# Patient Record
Sex: FEMALE | Race: WHITE | NOT HISPANIC OR LATINO
[De-identification: names, ages, dates, MRNs, and addresses within clinical notes are randomized per-mention and may not be internally consistent; named-entity substitution may affect disease eponyms.]

---

## 2017-01-04 ENCOUNTER — APPOINTMENT (OUTPATIENT)
Dept: ORTHOPEDIC SURGERY | Facility: CLINIC | Age: 57
End: 2017-01-04

## 2017-01-04 VITALS
WEIGHT: 204 LBS | DIASTOLIC BLOOD PRESSURE: 83 MMHG | HEIGHT: 67 IN | BODY MASS INDEX: 32.02 KG/M2 | HEART RATE: 73 BPM | TEMPERATURE: 97.8 F | SYSTOLIC BLOOD PRESSURE: 157 MMHG

## 2017-01-04 DIAGNOSIS — Z80.9 FAMILY HISTORY OF MALIGNANT NEOPLASM, UNSPECIFIED: ICD-10-CM

## 2017-01-04 DIAGNOSIS — Z78.9 OTHER SPECIFIED HEALTH STATUS: ICD-10-CM

## 2017-01-04 DIAGNOSIS — Z80.42 FAMILY HISTORY OF MALIGNANT NEOPLASM OF PROSTATE: ICD-10-CM

## 2017-01-04 DIAGNOSIS — Z82.62 FAMILY HISTORY OF OSTEOPOROSIS: ICD-10-CM

## 2017-01-04 DIAGNOSIS — Z82.61 FAMILY HISTORY OF ARTHRITIS: ICD-10-CM

## 2017-01-05 ENCOUNTER — OTHER (OUTPATIENT)
Age: 57
End: 2017-01-05

## 2017-01-24 ENCOUNTER — APPOINTMENT (OUTPATIENT)
Dept: ORTHOPEDIC SURGERY | Facility: CLINIC | Age: 57
End: 2017-01-24

## 2017-09-13 ENCOUNTER — NON-APPOINTMENT (OUTPATIENT)
Age: 57
End: 2017-09-13

## 2017-09-13 ENCOUNTER — APPOINTMENT (OUTPATIENT)
Dept: CARDIOLOGY | Facility: CLINIC | Age: 57
End: 2017-09-13
Payer: COMMERCIAL

## 2017-09-13 VITALS
OXYGEN SATURATION: 96 % | BODY MASS INDEX: 34.21 KG/M2 | DIASTOLIC BLOOD PRESSURE: 80 MMHG | SYSTOLIC BLOOD PRESSURE: 140 MMHG | WEIGHT: 218 LBS | RESPIRATION RATE: 16 BRPM | HEIGHT: 67 IN | HEART RATE: 69 BPM | TEMPERATURE: 98.2 F

## 2017-09-13 DIAGNOSIS — R23.8 OTHER SKIN CHANGES: ICD-10-CM

## 2017-09-13 DIAGNOSIS — M25.50 PAIN IN UNSPECIFIED JOINT: ICD-10-CM

## 2017-09-13 DIAGNOSIS — F41.9 ANXIETY DISORDER, UNSPECIFIED: ICD-10-CM

## 2017-09-13 PROCEDURE — 99204 OFFICE O/P NEW MOD 45 MIN: CPT | Mod: 25

## 2017-09-13 PROCEDURE — 93000 ELECTROCARDIOGRAM COMPLETE: CPT

## 2017-09-29 ENCOUNTER — APPOINTMENT (OUTPATIENT)
Dept: CARDIOLOGY | Facility: CLINIC | Age: 57
End: 2017-09-29
Payer: COMMERCIAL

## 2017-09-29 PROCEDURE — 93306 TTE W/DOPPLER COMPLETE: CPT

## 2017-10-11 ENCOUNTER — NON-APPOINTMENT (OUTPATIENT)
Age: 57
End: 2017-10-11

## 2017-10-11 ENCOUNTER — APPOINTMENT (OUTPATIENT)
Dept: CARDIOLOGY | Facility: CLINIC | Age: 57
End: 2017-10-11
Payer: COMMERCIAL

## 2017-10-11 VITALS
SYSTOLIC BLOOD PRESSURE: 142 MMHG | WEIGHT: 215 LBS | BODY MASS INDEX: 33.74 KG/M2 | TEMPERATURE: 98.6 F | HEIGHT: 67 IN | HEART RATE: 79 BPM | DIASTOLIC BLOOD PRESSURE: 80 MMHG | OXYGEN SATURATION: 95 %

## 2017-10-11 DIAGNOSIS — R07.9 CHEST PAIN, UNSPECIFIED: ICD-10-CM

## 2017-10-11 PROCEDURE — 93000 ELECTROCARDIOGRAM COMPLETE: CPT | Mod: 59

## 2017-10-11 PROCEDURE — 93320 DOPPLER ECHO COMPLETE: CPT

## 2017-10-11 PROCEDURE — 93351 STRESS TTE COMPLETE: CPT

## 2017-10-11 PROCEDURE — 99214 OFFICE O/P EST MOD 30 MIN: CPT | Mod: 25

## 2017-10-11 PROCEDURE — 93325 DOPPLER ECHO COLOR FLOW MAPG: CPT

## 2018-06-25 ENCOUNTER — APPOINTMENT (OUTPATIENT)
Dept: OBGYN | Facility: CLINIC | Age: 58
End: 2018-06-25
Payer: COMMERCIAL

## 2018-06-25 VITALS
BODY MASS INDEX: 32.96 KG/M2 | RESPIRATION RATE: 16 BRPM | HEART RATE: 80 BPM | SYSTOLIC BLOOD PRESSURE: 128 MMHG | WEIGHT: 210 LBS | DIASTOLIC BLOOD PRESSURE: 72 MMHG | HEIGHT: 67 IN

## 2018-06-25 DIAGNOSIS — Z82.49 FAMILY HISTORY OF ISCHEMIC HEART DISEASE AND OTHER DISEASES OF THE CIRCULATORY SYSTEM: ICD-10-CM

## 2018-06-25 DIAGNOSIS — Z81.8 FAMILY HISTORY OF OTHER MENTAL AND BEHAVIORAL DISORDERS: ICD-10-CM

## 2018-06-25 PROCEDURE — 99386 PREV VISIT NEW AGE 40-64: CPT

## 2018-06-26 LAB — HPV HIGH+LOW RISK DNA PNL CVX: NOT DETECTED

## 2018-06-27 LAB — CYTOLOGY CVX/VAG DOC THIN PREP: NORMAL

## 2018-07-16 ENCOUNTER — EMERGENCY (EMERGENCY)
Facility: HOSPITAL | Age: 58
LOS: 0 days | Discharge: HOME | End: 2018-07-16
Attending: EMERGENCY MEDICINE | Admitting: EMERGENCY MEDICINE
Payer: COMMERCIAL

## 2018-07-16 VITALS
RESPIRATION RATE: 16 BRPM | HEART RATE: 74 BPM | DIASTOLIC BLOOD PRESSURE: 67 MMHG | SYSTOLIC BLOOD PRESSURE: 131 MMHG | OXYGEN SATURATION: 97 %

## 2018-07-16 VITALS
SYSTOLIC BLOOD PRESSURE: 151 MMHG | HEIGHT: 62 IN | HEART RATE: 153 BPM | TEMPERATURE: 98 F | WEIGHT: 199.96 LBS | DIASTOLIC BLOOD PRESSURE: 118 MMHG | OXYGEN SATURATION: 100 % | RESPIRATION RATE: 20 BRPM

## 2018-07-16 DIAGNOSIS — R00.2 PALPITATIONS: ICD-10-CM

## 2018-07-16 DIAGNOSIS — R00.0 TACHYCARDIA, UNSPECIFIED: ICD-10-CM

## 2018-07-16 DIAGNOSIS — R94.31 ABNORMAL ELECTROCARDIOGRAM [ECG] [EKG]: ICD-10-CM

## 2018-07-16 LAB
ALBUMIN SERPL ELPH-MCNC: 4 G/DL — SIGNIFICANT CHANGE UP (ref 3.3–5)
ALP SERPL-CCNC: 82 U/L — SIGNIFICANT CHANGE UP (ref 40–120)
ALT FLD-CCNC: 22 U/L — SIGNIFICANT CHANGE UP (ref 12–78)
ANION GAP SERPL CALC-SCNC: 12 MMOL/L — SIGNIFICANT CHANGE UP (ref 5–17)
APPEARANCE UR: CLEAR — SIGNIFICANT CHANGE UP
APTT BLD: 34.9 SEC — SIGNIFICANT CHANGE UP (ref 27.5–37.4)
AST SERPL-CCNC: 24 U/L — SIGNIFICANT CHANGE UP (ref 15–37)
BASOPHILS # BLD AUTO: 0.09 K/UL — SIGNIFICANT CHANGE UP (ref 0–0.2)
BASOPHILS NFR BLD AUTO: 0.9 % — SIGNIFICANT CHANGE UP (ref 0–2)
BILIRUB SERPL-MCNC: 0.5 MG/DL — SIGNIFICANT CHANGE UP (ref 0.2–1.2)
BILIRUB UR-MCNC: NEGATIVE — SIGNIFICANT CHANGE UP
BUN SERPL-MCNC: 14 MG/DL — SIGNIFICANT CHANGE UP (ref 7–23)
CALCIUM SERPL-MCNC: 9.3 MG/DL — SIGNIFICANT CHANGE UP (ref 8.5–10.1)
CHLORIDE SERPL-SCNC: 109 MMOL/L — HIGH (ref 96–108)
CK SERPL-CCNC: 272 U/L — HIGH (ref 26–192)
CO2 SERPL-SCNC: 23 MMOL/L — SIGNIFICANT CHANGE UP (ref 22–31)
COLOR SPEC: YELLOW — SIGNIFICANT CHANGE UP
CREAT SERPL-MCNC: 0.54 MG/DL — SIGNIFICANT CHANGE UP (ref 0.5–1.3)
DIFF PNL FLD: NEGATIVE — SIGNIFICANT CHANGE UP
EOSINOPHIL # BLD AUTO: 0.36 K/UL — SIGNIFICANT CHANGE UP (ref 0–0.5)
EOSINOPHIL NFR BLD AUTO: 3.6 % — SIGNIFICANT CHANGE UP (ref 0–6)
GLUCOSE SERPL-MCNC: 112 MG/DL — HIGH (ref 70–99)
GLUCOSE UR QL: NEGATIVE MG/DL — SIGNIFICANT CHANGE UP
HCT VFR BLD CALC: 41.8 % — SIGNIFICANT CHANGE UP (ref 34.5–45)
HGB BLD-MCNC: 13.9 G/DL — SIGNIFICANT CHANGE UP (ref 11.5–15.5)
IMM GRANULOCYTES NFR BLD AUTO: 0.2 % — SIGNIFICANT CHANGE UP (ref 0–1.5)
INR BLD: 0.98 RATIO — SIGNIFICANT CHANGE UP (ref 0.88–1.16)
KETONES UR-MCNC: NEGATIVE — SIGNIFICANT CHANGE UP
LEUKOCYTE ESTERASE UR-ACNC: NEGATIVE — SIGNIFICANT CHANGE UP
LYMPHOCYTES # BLD AUTO: 4.4 K/UL — HIGH (ref 1–3.3)
LYMPHOCYTES # BLD AUTO: 43.7 % — SIGNIFICANT CHANGE UP (ref 13–44)
MCHC RBC-ENTMCNC: 28.5 PG — SIGNIFICANT CHANGE UP (ref 27–34)
MCHC RBC-ENTMCNC: 33.3 GM/DL — SIGNIFICANT CHANGE UP (ref 32–36)
MCV RBC AUTO: 85.7 FL — SIGNIFICANT CHANGE UP (ref 80–100)
MONOCYTES # BLD AUTO: 0.79 K/UL — SIGNIFICANT CHANGE UP (ref 0–0.9)
MONOCYTES NFR BLD AUTO: 7.9 % — SIGNIFICANT CHANGE UP (ref 2–14)
NEUTROPHILS # BLD AUTO: 4.4 K/UL — SIGNIFICANT CHANGE UP (ref 1.8–7.4)
NEUTROPHILS NFR BLD AUTO: 43.7 % — SIGNIFICANT CHANGE UP (ref 43–77)
NITRITE UR-MCNC: NEGATIVE — SIGNIFICANT CHANGE UP
NRBC # BLD: 0 /100 WBCS — SIGNIFICANT CHANGE UP (ref 0–0)
PH UR: 7 — SIGNIFICANT CHANGE UP (ref 5–8)
PLATELET # BLD AUTO: 255 K/UL — SIGNIFICANT CHANGE UP (ref 150–400)
POTASSIUM SERPL-MCNC: 3.9 MMOL/L — SIGNIFICANT CHANGE UP (ref 3.5–5.3)
POTASSIUM SERPL-SCNC: 3.9 MMOL/L — SIGNIFICANT CHANGE UP (ref 3.5–5.3)
PROT SERPL-MCNC: 7.7 GM/DL — SIGNIFICANT CHANGE UP (ref 6–8.3)
PROT UR-MCNC: NEGATIVE MG/DL — SIGNIFICANT CHANGE UP
PROTHROM AB SERPL-ACNC: 10.6 SEC — SIGNIFICANT CHANGE UP (ref 9.8–12.7)
RBC # BLD: 4.88 M/UL — SIGNIFICANT CHANGE UP (ref 3.8–5.2)
RBC # FLD: 13.3 % — SIGNIFICANT CHANGE UP (ref 10.3–14.5)
SODIUM SERPL-SCNC: 144 MMOL/L — SIGNIFICANT CHANGE UP (ref 135–145)
SP GR SPEC: 1 — LOW (ref 1.01–1.02)
TROPONIN I SERPL-MCNC: <0.015 NG/ML — SIGNIFICANT CHANGE UP (ref 0.01–0.04)
UROBILINOGEN FLD QL: NEGATIVE MG/DL — SIGNIFICANT CHANGE UP
WBC # BLD: 10.06 K/UL — SIGNIFICANT CHANGE UP (ref 3.8–10.5)
WBC # FLD AUTO: 10.06 K/UL — SIGNIFICANT CHANGE UP (ref 3.8–10.5)

## 2018-07-16 PROCEDURE — 71045 X-RAY EXAM CHEST 1 VIEW: CPT | Mod: 26

## 2018-07-16 PROCEDURE — 93010 ELECTROCARDIOGRAM REPORT: CPT

## 2018-07-16 PROCEDURE — 99285 EMERGENCY DEPT VISIT HI MDM: CPT | Mod: 25

## 2018-07-16 RX ORDER — SODIUM CHLORIDE 9 MG/ML
3 INJECTION INTRAMUSCULAR; INTRAVENOUS; SUBCUTANEOUS ONCE
Qty: 0 | Refills: 0 | Status: COMPLETED | OUTPATIENT
Start: 2018-07-16 | End: 2018-07-16

## 2018-07-16 RX ORDER — ADENOSINE 3 MG/ML
6 INJECTION INTRAVENOUS ONCE
Qty: 0 | Refills: 0 | Status: COMPLETED | OUTPATIENT
Start: 2018-07-16 | End: 2018-07-16

## 2018-07-16 RX ORDER — SODIUM CHLORIDE 9 MG/ML
3 INJECTION INTRAMUSCULAR; INTRAVENOUS; SUBCUTANEOUS ONCE
Qty: 0 | Refills: 0 | Status: DISCONTINUED | OUTPATIENT
Start: 2018-07-16 | End: 2018-07-16

## 2018-07-16 RX ORDER — SODIUM CHLORIDE 9 MG/ML
2000 INJECTION INTRAMUSCULAR; INTRAVENOUS; SUBCUTANEOUS ONCE
Qty: 0 | Refills: 0 | Status: COMPLETED | OUTPATIENT
Start: 2018-07-16 | End: 2018-07-16

## 2018-07-16 RX ADMIN — ADENOSINE 6 MILLIGRAM(S): 3 INJECTION INTRAVENOUS at 04:18

## 2018-07-16 RX ADMIN — SODIUM CHLORIDE 2000 MILLILITER(S): 9 INJECTION INTRAMUSCULAR; INTRAVENOUS; SUBCUTANEOUS at 04:24

## 2018-07-16 RX ADMIN — SODIUM CHLORIDE 3 MILLILITER(S): 9 INJECTION INTRAMUSCULAR; INTRAVENOUS; SUBCUTANEOUS at 04:22

## 2018-07-16 NOTE — ED ADULT TRIAGE NOTE - CHIEF COMPLAINT QUOTE
Patient reports that she was woken up by a rapid heart beat and palpitations, reports a history of an unspecified tachyarrhythmia. Took aspirin PTA. Current heart rate in triage 153.

## 2018-07-16 NOTE — ED PROVIDER NOTE - OBJECTIVE STATEMENT
56 y/o female in ED c/o sudden onset of palpitations x 2 hours that awoke her from sleep.   pt states similar episode 2 wks ago that resolved on its own.   pt denies any fever, HA, cp, sob, n/v/d/abd pain.   no sick contacts or recent travel

## 2018-07-16 NOTE — ED ADULT NURSE NOTE - MODE OF DISCHARGE
Ambulatory no vertigo/no nasal discharge/no sinus symptoms/no nasal obstruction/no hearing difficulty

## 2018-07-16 NOTE — ED PROVIDER NOTE - PROGRESS NOTE DETAILS
pt and spouse told of results.  pt states feels better with no current symptoms.   agree with plan for d/c home with cardiology f/u.  copy of results given to pt.

## 2018-07-16 NOTE — ED ADULT NURSE NOTE - OBJECTIVE STATEMENT
Pt presents to ED c/o palpitations and chest pressure that woke her up. reports dizziness, pt is diaphoretic, AOx4, denies SOB, N/V. denies cardiac and medical history.

## 2018-07-16 NOTE — ED ADULT NURSE REASSESSMENT NOTE - NS ED NURSE REASSESS COMMENT FT1
ELG complete at triage, pt in SVT. Pt brought to Room 19, cardiac monitoring initiated, #20 IV inserted into L. AC, blood drawn, fluids hung. MD Durham at bedside to given 6mg Adenosine. continuous EKG in place, adenosine given, pt in sinus tachycardia at 96. pt reports relief from chest pressure.

## 2018-07-17 ENCOUNTER — NON-APPOINTMENT (OUTPATIENT)
Age: 58
End: 2018-07-17

## 2018-07-17 ENCOUNTER — APPOINTMENT (OUTPATIENT)
Dept: CARDIOLOGY | Facility: CLINIC | Age: 58
End: 2018-07-17
Payer: COMMERCIAL

## 2018-07-17 VITALS
OXYGEN SATURATION: 97 % | SYSTOLIC BLOOD PRESSURE: 130 MMHG | TEMPERATURE: 98.2 F | BODY MASS INDEX: 33.74 KG/M2 | DIASTOLIC BLOOD PRESSURE: 78 MMHG | HEART RATE: 82 BPM | HEIGHT: 67 IN | WEIGHT: 215 LBS

## 2018-07-17 PROBLEM — R00.0 TACHYCARDIA, UNSPECIFIED: Chronic | Status: ACTIVE | Noted: 2018-07-16

## 2018-07-17 PROCEDURE — 99215 OFFICE O/P EST HI 40 MIN: CPT | Mod: 25

## 2018-07-17 PROCEDURE — 93000 ELECTROCARDIOGRAM COMPLETE: CPT

## 2018-07-31 ENCOUNTER — APPOINTMENT (OUTPATIENT)
Dept: ELECTROPHYSIOLOGY | Facility: CLINIC | Age: 58
End: 2018-07-31
Payer: COMMERCIAL

## 2018-07-31 ENCOUNTER — NON-APPOINTMENT (OUTPATIENT)
Age: 58
End: 2018-07-31

## 2018-07-31 VITALS
BODY MASS INDEX: 34.87 KG/M2 | SYSTOLIC BLOOD PRESSURE: 137 MMHG | HEIGHT: 66 IN | WEIGHT: 217 LBS | HEART RATE: 71 BPM | DIASTOLIC BLOOD PRESSURE: 75 MMHG

## 2018-07-31 PROCEDURE — 99205 OFFICE O/P NEW HI 60 MIN: CPT

## 2018-07-31 PROCEDURE — 93000 ELECTROCARDIOGRAM COMPLETE: CPT

## 2018-08-02 ENCOUNTER — CLINICAL ADVICE (OUTPATIENT)
Age: 58
End: 2018-08-02

## 2018-08-07 ENCOUNTER — RX RENEWAL (OUTPATIENT)
Age: 58
End: 2018-08-07

## 2018-08-07 DIAGNOSIS — Z86.19 PERSONAL HISTORY OF OTHER INFECTIOUS AND PARASITIC DISEASES: ICD-10-CM

## 2018-08-22 ENCOUNTER — APPOINTMENT (OUTPATIENT)
Dept: CARDIOLOGY | Facility: CLINIC | Age: 58
End: 2018-08-22
Payer: COMMERCIAL

## 2018-08-22 VITALS
OXYGEN SATURATION: 98 % | DIASTOLIC BLOOD PRESSURE: 74 MMHG | HEIGHT: 66 IN | SYSTOLIC BLOOD PRESSURE: 132 MMHG | BODY MASS INDEX: 34.55 KG/M2 | WEIGHT: 215 LBS | HEART RATE: 76 BPM

## 2018-08-22 PROCEDURE — 99215 OFFICE O/P EST HI 40 MIN: CPT

## 2018-09-25 ENCOUNTER — NON-APPOINTMENT (OUTPATIENT)
Age: 58
End: 2018-09-25

## 2018-09-25 ENCOUNTER — APPOINTMENT (OUTPATIENT)
Dept: CARDIOLOGY | Facility: CLINIC | Age: 58
End: 2018-09-25
Payer: COMMERCIAL

## 2018-09-25 VITALS
BODY MASS INDEX: 33.91 KG/M2 | HEART RATE: 68 BPM | SYSTOLIC BLOOD PRESSURE: 119 MMHG | HEIGHT: 66 IN | DIASTOLIC BLOOD PRESSURE: 79 MMHG | TEMPERATURE: 98.7 F | RESPIRATION RATE: 16 BRPM | OXYGEN SATURATION: 97 % | WEIGHT: 211 LBS

## 2018-09-25 DIAGNOSIS — R00.2 PALPITATIONS: ICD-10-CM

## 2018-09-25 DIAGNOSIS — I47.1 SUPRAVENTRICULAR TACHYCARDIA: ICD-10-CM

## 2018-09-25 PROCEDURE — 99214 OFFICE O/P EST MOD 30 MIN: CPT

## 2018-09-25 RX ORDER — FLUCONAZOLE 150 MG/1
150 TABLET ORAL
Qty: 2 | Refills: 0 | Status: DISCONTINUED | COMMUNITY
Start: 2018-08-07 | End: 2018-09-25

## 2018-09-25 RX ORDER — METOPROLOL SUCCINATE 25 MG/1
25 TABLET, EXTENDED RELEASE ORAL DAILY
Qty: 1 | Refills: 1 | Status: DISCONTINUED | COMMUNITY
Start: 2018-08-22 | End: 2018-09-25

## 2018-09-26 PROBLEM — R00.2 PALPITATIONS: Status: ACTIVE | Noted: 2017-09-13

## 2018-09-26 PROBLEM — I47.1 SUPRAVENTRICULAR TACHYCARDIA: Status: ACTIVE | Noted: 2018-07-31

## 2018-10-17 ENCOUNTER — APPOINTMENT (OUTPATIENT)
Dept: CARDIOLOGY | Facility: CLINIC | Age: 58
End: 2018-10-17

## 2018-11-15 ENCOUNTER — APPOINTMENT (OUTPATIENT)
Dept: ELECTROPHYSIOLOGY | Facility: CLINIC | Age: 58
End: 2018-11-15

## 2019-01-06 NOTE — ED ADULT TRIAGE NOTE - NS ED TRIAGE AVPU SCALE
Alert-The patient is alert, awake and responds to voice. The patient is oriented to time, place, and person. The triage nurse is able to obtain subjective information.
No

## 2019-02-06 ENCOUNTER — APPOINTMENT (OUTPATIENT)
Dept: CARDIOLOGY | Facility: CLINIC | Age: 59
End: 2019-02-06

## 2019-08-09 ENCOUNTER — APPOINTMENT (OUTPATIENT)
Dept: OBGYN | Facility: CLINIC | Age: 59
End: 2019-08-09
Payer: COMMERCIAL

## 2019-08-09 VITALS
SYSTOLIC BLOOD PRESSURE: 124 MMHG | DIASTOLIC BLOOD PRESSURE: 66 MMHG | WEIGHT: 195 LBS | HEIGHT: 67 IN | BODY MASS INDEX: 30.61 KG/M2

## 2019-08-09 PROCEDURE — 99396 PREV VISIT EST AGE 40-64: CPT

## 2019-08-12 LAB — HPV HIGH+LOW RISK DNA PNL CVX: NOT DETECTED

## 2019-10-25 ENCOUNTER — APPOINTMENT (OUTPATIENT)
Dept: CARDIOLOGY | Facility: CLINIC | Age: 59
End: 2019-10-25

## 2020-01-24 ENCOUNTER — OTHER (OUTPATIENT)
Age: 60
End: 2020-01-24

## 2020-01-24 DIAGNOSIS — B00.2 HERPESVIRAL GINGIVOSTOMATITIS AND PHARYNGOTONSILLITIS: ICD-10-CM

## 2020-01-24 RX ORDER — VALACYCLOVIR 500 MG/1
500 TABLET, FILM COATED ORAL TWICE DAILY
Qty: 6 | Refills: 2 | Status: ACTIVE | COMMUNITY
Start: 2020-01-24 | End: 1900-01-01

## 2020-09-24 ENCOUNTER — APPOINTMENT (OUTPATIENT)
Dept: OBGYN | Facility: CLINIC | Age: 60
End: 2020-09-24
Payer: COMMERCIAL

## 2020-09-24 VITALS
OXYGEN SATURATION: 98 % | BODY MASS INDEX: 29.51 KG/M2 | HEIGHT: 67 IN | SYSTOLIC BLOOD PRESSURE: 124 MMHG | DIASTOLIC BLOOD PRESSURE: 76 MMHG | WEIGHT: 188 LBS | RESPIRATION RATE: 18 BRPM

## 2020-09-24 DIAGNOSIS — Z12.31 ENCOUNTER FOR SCREENING MAMMOGRAM FOR MALIGNANT NEOPLASM OF BREAST: ICD-10-CM

## 2020-09-24 PROCEDURE — 99396 PREV VISIT EST AGE 40-64: CPT

## 2020-09-28 LAB — HPV HIGH+LOW RISK DNA PNL CVX: NOT DETECTED

## 2020-10-08 ENCOUNTER — APPOINTMENT (OUTPATIENT)
Dept: ULTRASOUND IMAGING | Facility: CLINIC | Age: 60
End: 2020-10-08
Payer: COMMERCIAL

## 2020-10-08 ENCOUNTER — RESULT REVIEW (OUTPATIENT)
Age: 60
End: 2020-10-08

## 2020-10-08 ENCOUNTER — OUTPATIENT (OUTPATIENT)
Dept: OUTPATIENT SERVICES | Facility: HOSPITAL | Age: 60
LOS: 1 days | End: 2020-10-08
Payer: COMMERCIAL

## 2020-10-08 ENCOUNTER — APPOINTMENT (OUTPATIENT)
Dept: MAMMOGRAPHY | Facility: CLINIC | Age: 60
End: 2020-10-08
Payer: COMMERCIAL

## 2020-10-08 DIAGNOSIS — Z00.8 ENCOUNTER FOR OTHER GENERAL EXAMINATION: ICD-10-CM

## 2020-10-08 DIAGNOSIS — Z12.31 ENCOUNTER FOR SCREENING MAMMOGRAM FOR MALIGNANT NEOPLASM OF BREAST: ICD-10-CM

## 2020-10-08 PROCEDURE — 77063 BREAST TOMOSYNTHESIS BI: CPT | Mod: 26

## 2020-10-08 PROCEDURE — 76641 ULTRASOUND BREAST COMPLETE: CPT | Mod: 26,50

## 2020-10-08 PROCEDURE — 77067 SCR MAMMO BI INCL CAD: CPT | Mod: 26

## 2020-10-08 PROCEDURE — 77063 BREAST TOMOSYNTHESIS BI: CPT

## 2020-10-08 PROCEDURE — 77067 SCR MAMMO BI INCL CAD: CPT

## 2020-10-08 PROCEDURE — 76641 ULTRASOUND BREAST COMPLETE: CPT

## 2020-12-16 PROBLEM — Z86.19 HISTORY OF CANDIDAL VULVOVAGINITIS: Status: RESOLVED | Noted: 2018-08-07 | Resolved: 2020-12-16

## 2021-09-14 ENCOUNTER — NON-APPOINTMENT (OUTPATIENT)
Age: 61
End: 2021-09-14

## 2021-09-15 ENCOUNTER — APPOINTMENT (OUTPATIENT)
Dept: ORTHOPEDIC SURGERY | Facility: CLINIC | Age: 61
End: 2021-09-15
Payer: COMMERCIAL

## 2021-09-15 DIAGNOSIS — M19.071 PRIMARY OSTEOARTHRITIS, RIGHT ANKLE AND FOOT: ICD-10-CM

## 2021-09-15 PROCEDURE — 99203 OFFICE O/P NEW LOW 30 MIN: CPT

## 2021-09-15 PROCEDURE — 73630 X-RAY EXAM OF FOOT: CPT | Mod: RT

## 2021-09-15 NOTE — HISTORY OF PRESENT ILLNESS
[FreeTextEntry1] : 9/15/2021: BRANNON RAMIREZ is a 60 year old female who is familiar to the clinic presenting for an initial evaluation of chronic right foot pain. She was previously seen for the same issue in January 2017. The patient’s pain is noted to be a 2/10. She describes an intermittent, sharp, and burning pain localized to her right foot. She denies any numbness or tingling sensations. No other complaints at this time. 		\par

## 2021-09-15 NOTE — DISCUSSION/SUMMARY
[de-identified] : Today I had a lengthy discussion with the patient regarding their right foot pain.I have addressed all the patient's concerns surrounding the pathology of their condition. XR imaging was completed in office today and results were reviewed with the patient. At this time, I recommend that the patient utilize ice, NSAIDs, and heat PRN. They can also elevate their right foot above the level of the heart.		\par \par  I would like to obtain advanced imaging of the patient's right foot. An MRI was ordered so I can find out more about the etiology of the patient's condition. The patient should follow up with the office after obtaining the MRI. The patient understood and verbally agreed to the treatment plan. All of their questions were answered and they were satisfied with the visit. The patient should call the office if they have any questions or experience worsening symptoms.  				\par

## 2021-09-15 NOTE — ADDENDUM
[FreeTextEntry1] : I, Marianna De Leon, acted solely as a scribe for Dr. Berny Daly on this date 09/15/2021.\par \par All medical record entries made by the Scribe were at my, Dr. Berny Daly, direction and personally dictated by me on 09/15/2021 . I have reviewed the chart and agree that the record accurately reflects my personal performance of the history, physical exam, assessment and plan. I have also personally directed, reviewed, and agreed with the chart.	\par

## 2021-09-15 NOTE — PHYSICAL EXAM
[de-identified] : General: Alert and oriented x3. In no acute distress. Pleasant in nature with a normal affect. No apparent respiratory distress.\par \par Right Foot Exam\par Skin: Clean, dry, intact\par Inspection: No obvious malalignment, no masses, no swelling, no effusion\par Pulses: 2+ DP/PT pulses\par ROM: FOOT Full  ROM of digits, ANKLE 10 degrees of dorsiflexion, 40 degrees of plantarflexion, 10 degrees of subtalar motion.\par Painful ROM: None\par Tenderness: No tenderness over the medial malleolus, No tenderness over the lateral malleolus, no CFL/ATFL/PTFL pain, no deltoid ligament pain. No heel pain. No Achilles tenderness. No 5th metatarsal pain. No pain to the LisFranc joint. No ttp over the posterior tibial tendon.\par Stability: Negative anterior/posterior drawer.\par Strength: 5/5 ADD/ABD/TA/GS/EHL/FHL/EDL\par Neuro: Sensation in tact to light touch throughout\par Additional tests: Negative Mortons test, negative tarsal tunnel tinels, negative single heel rise.			\par  [de-identified] : 3V of the right foot were ordered, obtained, and reviewed by me today, 09/15/2021, revealed: midfoot arthritis, slight skew observed on XR imaging. \par \par XRs from 2017 compared to XRs today revealing midfoot arthritis.

## 2021-10-07 ENCOUNTER — OUTPATIENT (OUTPATIENT)
Dept: OUTPATIENT SERVICES | Facility: HOSPITAL | Age: 61
LOS: 1 days | End: 2021-10-07
Payer: COMMERCIAL

## 2021-10-07 ENCOUNTER — APPOINTMENT (OUTPATIENT)
Dept: MRI IMAGING | Facility: CLINIC | Age: 61
End: 2021-10-07
Payer: COMMERCIAL

## 2021-10-07 DIAGNOSIS — M77.41 METATARSALGIA, RIGHT FOOT: ICD-10-CM

## 2021-10-07 DIAGNOSIS — M19.071 PRIMARY OSTEOARTHRITIS, RIGHT ANKLE AND FOOT: ICD-10-CM

## 2021-10-07 DIAGNOSIS — M79.671 PAIN IN RIGHT FOOT: ICD-10-CM

## 2021-10-07 PROCEDURE — 73718 MRI LOWER EXTREMITY W/O DYE: CPT

## 2021-10-07 PROCEDURE — 73718 MRI LOWER EXTREMITY W/O DYE: CPT | Mod: 26,RT

## 2021-10-14 ENCOUNTER — APPOINTMENT (OUTPATIENT)
Dept: ORTHOPEDIC SURGERY | Facility: CLINIC | Age: 61
End: 2021-10-14
Payer: COMMERCIAL

## 2021-10-14 DIAGNOSIS — M79.671 PAIN IN RIGHT FOOT: ICD-10-CM

## 2021-10-14 DIAGNOSIS — M19.079 PRIMARY OSTEOARTHRITIS, UNSPECIFIED ANKLE AND FOOT: ICD-10-CM

## 2021-10-14 DIAGNOSIS — G89.29 PAIN IN RIGHT FOOT: ICD-10-CM

## 2021-10-14 DIAGNOSIS — M77.41 METATARSALGIA, RIGHT FOOT: ICD-10-CM

## 2021-10-14 PROCEDURE — 99214 OFFICE O/P EST MOD 30 MIN: CPT

## 2021-10-14 NOTE — DISCUSSION/SUMMARY
[de-identified] : Today I had a lengthy discussion with the patient regarding their right foot pain, midfoot arthritis. I have addressed all the patient's concerns surrounding the pathology of their condition. MRI results were reviewed with the patient today in detail. A discussion was had about shoe-wear modifications. I advised the patient to utilize a wide toed cross training sneaker that better accommodates the feet. I recommended New Balance, Flores, or Saucony to the patient. With regards to activity, I advised the patient to utilize activities such as swimming or the utilization of the [facility] machine.\par \par A discussion was had about utilizing custom orthotics. The patient was educated about custom orthotics in the office today.\par \par For pain and inflammation, I recommend that the patient utilize Voltaren gel topically. If the Voltaren gel could not be obtained, Icy Hot, Biofreeze, or Bengay can be utilized instead. I also advised that the patient utilize ice, NSAIDs, and heat PRN. They can also elevate their right foot above the level of the heart.		\par \par The patient understood and verbally agreed to the treatment plan. All of their questions were answered and they were satisfied with the visit. The patient should call the office if they have any questions or experience worsening symptoms. I would like to see the patient back in the office in 2-3 months to reassess their condition. 				\par

## 2021-10-14 NOTE — ADDENDUM
[FreeTextEntry1] : I, Marianna De Leon, acted solely as a scribe for Dr. Berny Daly on this date 10/14/2021.\par \par All medical record entries made by the Scribe were at my, Dr. Berny Daly, direction and personally dictated by me on 10/14/2021 . I have reviewed the chart and agree that the record accurately reflects my personal performance of the history, physical exam, assessment and plan. I have also personally directed, reviewed, and agreed with the chart.	\par

## 2021-10-14 NOTE — HISTORY OF PRESENT ILLNESS
[FreeTextEntry1] : 10/14/2021: BRANNON RAMIREZ is a 60 year old female presenting for a follow-up evaluation of chronic right foot pain. The patient is here at the clinic to review MRI results obtained on 10/7/2021. The patient c/o sharp pain to the base of her fifth metatarsal. She presents FWB with sandals. She denies any numbness or tingling sensations. No other complaints.  		\par \par 9/15/2021: BRANNON RAMIREZ is a 60 year old female who is familiar to the clinic presenting for an initial evaluation of chronic right foot pain. She was previously seen for the same issue in January 2017. The patient’s pain is noted to be a 2/10. She describes an intermittent, sharp, and burning pain localized to her right foot. She denies any numbness or tingling sensations. No other complaints at this time. 		\par

## 2021-10-14 NOTE — PHYSICAL EXAM
[de-identified] : General: Alert and oriented x3. In no acute distress. Pleasant in nature with a normal affect. No apparent respiratory distress.\par \par Right Foot Exam\par Skin: Clean, dry, intact\par Inspection: No obvious malalignment, no masses, no swelling, no effusion\par Pulses: 2+ DP/PT pulses\par ROM: FOOT Full  ROM of digits, ANKLE 10 degrees of dorsiflexion, 40 degrees of plantarflexion, 10 degrees of subtalar motion.\par Painful ROM: None\par Tenderness: No tenderness over the medial malleolus, No tenderness over the lateral malleolus, no CFL/ATFL/PTFL pain, no deltoid ligament pain. No heel pain. No Achilles tenderness. + base of the 5th metatarsal pain. No pain to the LisFranc joint. No ttp over the posterior tibial tendon.\par Stability: Negative anterior/posterior drawer.\par Strength: 5/5 ADD/ABD/TA/GS/EHL/FHL/EDL\par Neuro: Sensation in tact to light touch throughout\par Additional tests: Negative Mortons test, negative tarsal tunnel tinels, negative single heel rise.			\par  [de-identified] : EXAM:  MR FOOT RT\par \par PROCEDURE DATE:  10/07/2021\par \par \par \par INTERPRETATION:  MR FOOT RIGHT\par \par HISTORY:  Right midfoot pain. 12 months duration.. Prior ankle surgery in 2009.\par \par TECHNIQUE:  Multiplanar MRI of the right midfoot was performed without contrast using 6 sequences.\par \par COMPARISON:  None.\par \par FINDINGS:\par \par OSSEOUS STRUCTURES\par Fractures/Stress Reactions:  There is presumed reactive or stress-related edema involving the 5th metatarsal proximal diametaphysis.\par Cyst(s): Intraosseous ganglion is noted within the 2nd metatarsal base. Intraosseous ganglion or vascular lake is noted within the calcaneus at the angle of Gissane.\par \par LIGAMENTS\par Talofibular/Calcaneofibular Ligaments:  Postsurgical changes are present with moderate attenuation of the anterior talofibular ligament. Posterior talofibular ligament and calcaneofibular ligament appear intact.\par Tibiofibular/Syndesmotic Ligaments:  Intact.\par Medial Deltoid Ligament Complex:  Intact.\par Subtalar Ligaments:  Intact.\par Lisfranc Ligament: Intact.\par \par TENDONS\par Flexor Tendons:  Intact.\par Extensor Tendons:  Intact.\par \par VISUALIZED PLANTAR FASCIA\par Intact.\par \par HINDFOOT/ANKLE JOINTS\par Articular Surfaces:  Preserved.\par Effusions/Synovitis:  None.\par \par INTERTARSAL JOINTS\par Articular Surfaces:  Small naviculocuneiform subchondral cysts are present.\par Effusions/Synovitis:  None.\par \par TARSOMETATARSAL JOINTS\par Articular Surfaces:  Subchondral reactive edema, cysts, and small osteophytes are present between the 2nd and 5th tarsometatarsal joints with cartilage thinning that is most pronounced at the 2nd and 3rd.\par Effusions/Synovitis:  None.\par \par 1ST METATARSOPHALANGEAL JOINT\par Articular Surfaces:  Preserved.\par Effusions/Synovitis:  None.\par Sesamoids:  Intact.\par \par LESSER METATARSOPHALANGEAL JOINTS\par Articular Surfaces:  Preserved.\par Effusions/Synovitis:  None.\par \par INTERMETATARSAL SPACES\par Neuromas:  None.\par Bursa:  None.\par \par SOFT TISSUES\par Adventitial Bursa:  None.\par Musculature:  Maintained.\par Subcutaneous Tissues:  Mild scattered subcutaneous edema is noted. There is asymmetric prominence of the subcutaneous fat along the anterior margin of the lateral malleolus measuring approximately 3.2 x 2.9 x 1.3 cm.\par \par NEUROVASCULAR STRUCTURES\par Tarsal Tunnel:  Visualized tarsal tunnel structures appear preserved.\par \par IMPRESSION:\par 1. 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 Prior lateral ligament repair with moderate attenuation of the anterior talofibular ligament.\par 3. Reactive or stress-related edema involves the 5th metatarsal proximal diametaphysis.\par 4. Asymmetric prominence of the subcutaneous fat along the anterior margin of the lateral malleolus may reflect normal asymmetry or possibly a lipoma in the appropriate setting.\par \par --- End of Report ---\par \par \par GABRIEL GONZALEZ MD; Attending Radiologist\par This document has been electronically signed. Oct 12 2021 10:50AM

## 2021-10-21 ENCOUNTER — APPOINTMENT (OUTPATIENT)
Dept: OBGYN | Facility: CLINIC | Age: 61
End: 2021-10-21
Payer: COMMERCIAL

## 2021-10-21 VITALS
WEIGHT: 200 LBS | SYSTOLIC BLOOD PRESSURE: 126 MMHG | DIASTOLIC BLOOD PRESSURE: 60 MMHG | BODY MASS INDEX: 31.39 KG/M2 | HEIGHT: 67 IN

## 2021-10-21 DIAGNOSIS — Z01.419 ENCOUNTER FOR GYNECOLOGICAL EXAMINATION (GENERAL) (ROUTINE) W/OUT ABNORMAL FINDINGS: ICD-10-CM

## 2021-10-21 DIAGNOSIS — Z12.39 ENCOUNTER FOR OTHER SCREENING FOR MALIGNANT NEOPLASM OF BREAST: ICD-10-CM

## 2021-10-21 PROCEDURE — 99396 PREV VISIT EST AGE 40-64: CPT

## 2021-10-22 LAB — HPV HIGH+LOW RISK DNA PNL CVX: NOT DETECTED

## 2021-11-04 ENCOUNTER — RESULT REVIEW (OUTPATIENT)
Age: 61
End: 2021-11-04

## 2021-11-04 ENCOUNTER — OUTPATIENT (OUTPATIENT)
Dept: OUTPATIENT SERVICES | Facility: HOSPITAL | Age: 61
LOS: 1 days | End: 2021-11-04
Payer: COMMERCIAL

## 2021-11-04 ENCOUNTER — APPOINTMENT (OUTPATIENT)
Dept: MAMMOGRAPHY | Facility: CLINIC | Age: 61
End: 2021-11-04

## 2021-11-04 ENCOUNTER — APPOINTMENT (OUTPATIENT)
Dept: ULTRASOUND IMAGING | Facility: CLINIC | Age: 61
End: 2021-11-04
Payer: COMMERCIAL

## 2021-11-04 ENCOUNTER — APPOINTMENT (OUTPATIENT)
Dept: RADIOLOGY | Facility: CLINIC | Age: 61
End: 2021-11-04
Payer: COMMERCIAL

## 2021-11-04 DIAGNOSIS — Z00.8 ENCOUNTER FOR OTHER GENERAL EXAMINATION: ICD-10-CM

## 2021-11-04 DIAGNOSIS — Z12.39 ENCOUNTER FOR OTHER SCREENING FOR MALIGNANT NEOPLASM OF BREAST: ICD-10-CM

## 2021-11-04 PROCEDURE — 77067 SCR MAMMO BI INCL CAD: CPT | Mod: 26

## 2021-11-04 PROCEDURE — 76641 ULTRASOUND BREAST COMPLETE: CPT | Mod: 26,50

## 2021-11-04 PROCEDURE — 77063 BREAST TOMOSYNTHESIS BI: CPT | Mod: 26

## 2021-11-04 PROCEDURE — 77085 DXA BONE DENSITY AXL VRT FX: CPT

## 2021-11-04 PROCEDURE — 77085 DXA BONE DENSITY AXL VRT FX: CPT | Mod: 26

## 2021-11-04 PROCEDURE — 77067 SCR MAMMO BI INCL CAD: CPT

## 2021-11-04 PROCEDURE — 77063 BREAST TOMOSYNTHESIS BI: CPT

## 2021-11-04 PROCEDURE — 76641 ULTRASOUND BREAST COMPLETE: CPT

## 2021-11-10 ENCOUNTER — NON-APPOINTMENT (OUTPATIENT)
Age: 61
End: 2021-11-10

## 2022-05-23 ENCOUNTER — APPOINTMENT (OUTPATIENT)
Dept: OTOLARYNGOLOGY | Facility: CLINIC | Age: 62
End: 2022-05-23
Payer: COMMERCIAL

## 2022-05-23 VITALS — WEIGHT: 200 LBS | BODY MASS INDEX: 31.39 KG/M2 | HEIGHT: 67 IN | TEMPERATURE: 97.5 F

## 2022-05-23 DIAGNOSIS — K21.9 GASTRO-ESOPHAGEAL REFLUX DISEASE W/OUT ESOPHAGITIS: ICD-10-CM

## 2022-05-23 PROCEDURE — 92567 TYMPANOMETRY: CPT

## 2022-05-23 PROCEDURE — 31575 DIAGNOSTIC LARYNGOSCOPY: CPT

## 2022-05-23 PROCEDURE — 92557 COMPREHENSIVE HEARING TEST: CPT

## 2022-05-23 PROCEDURE — 99203 OFFICE O/P NEW LOW 30 MIN: CPT | Mod: 25

## 2022-05-23 RX ORDER — PANTOPRAZOLE 40 MG/1
40 TABLET, DELAYED RELEASE ORAL
Qty: 1 | Refills: 2 | Status: ACTIVE | COMMUNITY
Start: 2022-05-23 | End: 1900-01-01

## 2022-05-23 NOTE — REASON FOR VISIT
Calm/Appropriate [Initial Consultation] : an initial consultation for [FreeTextEntry2] : pharyngitis

## 2022-05-23 NOTE — HISTORY OF PRESENT ILLNESS
[de-identified] : co hoarse voice past 2 mo starting w covid\par no pain, cannot raise volume\par seasonal allergies, oral antihistamines\par no dysphagia, neg hx reflux\par neg tobac, no voice overuse\par co gradual hearing loss rt more than left

## 2022-05-23 NOTE — DATA REVIEWED
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

## 2022-05-23 NOTE — PROCEDURE
[de-identified] : Indication for procedure:  Unable to examine laryngeal structures with mirror exam, persistent hoarseness\par The patient has persistent weak voice\par \par Scope # 99\par Topical anesthesia with viscous xylocaine 2% is applied to the anterior nares.\par A flexible fiberoptic laryngoscope is than introduced through the nares.\par The nasopharynx is clear without mass or inflammation.\par The posterior pharyngeal wall is unremarkable.\par The tongue base and vallecula are unremarkable.  The hypopharynx id clear and unobstructed.\par The supraglottic larynx is within normal limits.\par Both vocal cords are fully mobile with diffuse bilateral vocal cord edema.  \par No Nodules or other lesions are noted.\par There is no edema or erythema overlying the arytenoid cartilages or the inter-arytenoid space.\par The subglottic space is clear.\par The voice has a mild raspy quality.\par

## 2022-05-23 NOTE — ASSESSMENT
[FreeTextEntry1] : exam w bilateral vc mild swelling\par laryngeal signs reflux\par trial pantoprazole 40\par fu 6 weeks\par audio  mild asymmetric loss\par fu audio 1 y

## 2022-06-19 ENCOUNTER — NON-APPOINTMENT (OUTPATIENT)
Age: 62
End: 2022-06-19

## 2022-11-03 ENCOUNTER — RX ONLY (RX ONLY)
Age: 62
End: 2022-11-03

## 2022-11-03 ENCOUNTER — OFFICE (OUTPATIENT)
Dept: URBAN - METROPOLITAN AREA CLINIC 100 | Facility: CLINIC | Age: 62
Setting detail: OPHTHALMOLOGY
End: 2022-11-03
Payer: COMMERCIAL

## 2022-11-03 DIAGNOSIS — H04.563: ICD-10-CM

## 2022-11-03 DIAGNOSIS — H04.123: ICD-10-CM

## 2022-11-03 DIAGNOSIS — H04.561: ICD-10-CM

## 2022-11-03 DIAGNOSIS — H04.213: ICD-10-CM

## 2022-11-03 DIAGNOSIS — H01.004: ICD-10-CM

## 2022-11-03 DIAGNOSIS — H01.001: ICD-10-CM

## 2022-11-03 DIAGNOSIS — H02.535: ICD-10-CM

## 2022-11-03 DIAGNOSIS — H02.532: ICD-10-CM

## 2022-11-03 PROCEDURE — 68801 DILATE TEAR DUCT OPENING: CPT | Performed by: OPHTHALMOLOGY

## 2022-11-03 PROCEDURE — 92012 INTRM OPH EXAM EST PATIENT: CPT | Performed by: OPHTHALMOLOGY

## 2022-11-03 PROCEDURE — 68840 EXPLORE/IRRIGATE TEAR DUCTS: CPT | Performed by: OPHTHALMOLOGY

## 2022-11-03 PROCEDURE — 83861 MICROFLUID ANALY TEARS: CPT | Performed by: OPHTHALMOLOGY

## 2022-11-03 ASSESSMENT — SPHEQUIV_DERIVED
OD_SPHEQUIV: -0.125
OS_SPHEQUIV: -0.875

## 2022-11-03 ASSESSMENT — REFRACTION_AUTOREFRACTION
OS_SPHERE: -0.25
OS_AXIS: 012
OD_SPHERE: +0.50
OD_CYLINDER: -1.25
OD_AXIS: 127
OS_CYLINDER: -1.25

## 2022-11-03 ASSESSMENT — LID EXAM ASSESSMENTS
OS_BLEPHARITIS: 1+
OS_COMMENTS: 1+ LAXIT
OD_COMMENTS: 1+ LAXIT
OD_BLEPHARITIS: 1+

## 2022-11-03 ASSESSMENT — CONFRONTATIONAL VISUAL FIELD TEST (CVF)
OS_FINDINGS: FULL
OD_FINDINGS: FULL

## 2022-11-03 ASSESSMENT — VISUAL ACUITY
OS_BCVA: 20/25
OD_BCVA: 20/20

## 2022-11-03 ASSESSMENT — AXIALLENGTH_DERIVED
OD_AL: 23.4124
OS_AL: 23.2492

## 2022-11-03 ASSESSMENT — TEAR BREAK UP TIME (TBUT)
OS_TBUT: T 1+
OD_TBUT: 3+

## 2022-11-03 ASSESSMENT — KERATOMETRY
OD_AXISANGLE_DEGREES: 065
OD_K1POWER_DIOPTERS: 43.50
OS_AXISANGLE_DEGREES: 109
OS_K2POWER_DIOPTERS: 46.50
OS_K1POWER_DIOPTERS: 44.25
METHOD_AUTO_MANUAL: AUTO
OD_K2POWER_DIOPTERS: 44.75

## 2022-11-03 ASSESSMENT — PUNCTA - ASSESSMENT
OS_PUNCTA: SMALL
OD_PUNCTA: SMALL

## 2022-11-17 ENCOUNTER — APPOINTMENT (OUTPATIENT)
Dept: OBGYN | Facility: CLINIC | Age: 62
End: 2022-11-17

## 2022-11-17 VITALS
BODY MASS INDEX: 32.02 KG/M2 | HEIGHT: 67 IN | WEIGHT: 204 LBS | DIASTOLIC BLOOD PRESSURE: 66 MMHG | SYSTOLIC BLOOD PRESSURE: 118 MMHG

## 2022-11-17 PROCEDURE — 99396 PREV VISIT EST AGE 40-64: CPT

## 2022-11-19 LAB — HPV HIGH+LOW RISK DNA PNL CVX: NOT DETECTED

## 2022-11-21 LAB — CYTOLOGY CVX/VAG DOC THIN PREP: NORMAL

## 2022-12-08 ENCOUNTER — OFFICE (OUTPATIENT)
Dept: URBAN - METROPOLITAN AREA CLINIC 100 | Facility: CLINIC | Age: 62
Setting detail: OPHTHALMOLOGY
End: 2022-12-08
Payer: COMMERCIAL

## 2022-12-08 DIAGNOSIS — H04.563: ICD-10-CM

## 2022-12-08 PROCEDURE — 68440 SNIP INC LACRIMAL PUNCTUM: CPT | Performed by: OPHTHALMOLOGY

## 2022-12-08 ASSESSMENT — SPHEQUIV_DERIVED
OD_SPHEQUIV: -0.125
OS_SPHEQUIV: -0.875

## 2022-12-08 ASSESSMENT — REFRACTION_AUTOREFRACTION
OS_CYLINDER: -1.25
OD_SPHERE: +0.50
OD_AXIS: 127
OD_CYLINDER: -1.25
OS_AXIS: 012
OS_SPHERE: -0.25

## 2022-12-08 ASSESSMENT — LID EXAM ASSESSMENTS
OS_BLEPHARITIS: 1+
OD_COMMENTS: 1+ LAXIT
OS_COMMENTS: 1+ LAXIT
OD_BLEPHARITIS: 1+

## 2022-12-08 ASSESSMENT — PUNCTA - ASSESSMENT
OD_PUNCTA: SMALL
OS_PUNCTA: SMALL

## 2022-12-08 ASSESSMENT — KERATOMETRY
OD_K2POWER_DIOPTERS: 44.75
OD_K1POWER_DIOPTERS: 43.50
OD_AXISANGLE_DEGREES: 065
OS_AXISANGLE_DEGREES: 109
METHOD_AUTO_MANUAL: AUTO
OS_K2POWER_DIOPTERS: 46.50
OS_K1POWER_DIOPTERS: 44.25

## 2022-12-08 ASSESSMENT — TEAR BREAK UP TIME (TBUT)
OS_TBUT: T 1+
OD_TBUT: 3+

## 2022-12-08 ASSESSMENT — AXIALLENGTH_DERIVED
OS_AL: 23.2492
OD_AL: 23.4124

## 2022-12-08 ASSESSMENT — VISUAL ACUITY
OD_BCVA: 20/20
OS_BCVA: 20/25

## 2022-12-08 ASSESSMENT — CONFRONTATIONAL VISUAL FIELD TEST (CVF)
OS_FINDINGS: FULL
OD_FINDINGS: FULL

## 2022-12-12 ENCOUNTER — OFFICE (OUTPATIENT)
Dept: URBAN - METROPOLITAN AREA CLINIC 100 | Facility: CLINIC | Age: 62
Setting detail: OPHTHALMOLOGY
End: 2022-12-12
Payer: COMMERCIAL

## 2022-12-12 DIAGNOSIS — H04.563: ICD-10-CM

## 2022-12-12 DIAGNOSIS — H02.532: ICD-10-CM

## 2022-12-12 DIAGNOSIS — H01.001: ICD-10-CM

## 2022-12-12 DIAGNOSIS — H01.004: ICD-10-CM

## 2022-12-12 DIAGNOSIS — H02.535: ICD-10-CM

## 2022-12-12 PROBLEM — H04.213 EPIPHORA-DUE TO EXCESS LACRIMATION; BOTH EYES: Status: RESOLVED | Noted: 2022-11-03 | Resolved: 2022-12-12

## 2022-12-12 PROCEDURE — 99024 POSTOP FOLLOW-UP VISIT: CPT | Performed by: OPHTHALMOLOGY

## 2022-12-12 ASSESSMENT — KERATOMETRY
METHOD_AUTO_MANUAL: AUTO
OD_K2POWER_DIOPTERS: 44.75
OD_K1POWER_DIOPTERS: 43.50
OS_AXISANGLE_DEGREES: 109
OS_K1POWER_DIOPTERS: 44.25
OS_K2POWER_DIOPTERS: 46.50
OD_AXISANGLE_DEGREES: 065

## 2022-12-12 ASSESSMENT — REFRACTION_AUTOREFRACTION
OS_AXIS: 012
OS_CYLINDER: -1.25
OD_CYLINDER: -1.25
OS_SPHERE: -0.25
OD_AXIS: 127
OD_SPHERE: +0.50

## 2022-12-12 ASSESSMENT — AXIALLENGTH_DERIVED
OS_AL: 23.2492
OD_AL: 23.4124

## 2022-12-12 ASSESSMENT — SPHEQUIV_DERIVED
OS_SPHEQUIV: -0.875
OD_SPHEQUIV: -0.125

## 2022-12-12 ASSESSMENT — VISUAL ACUITY
OS_BCVA: 20/25
OD_BCVA: 20/20

## 2022-12-17 ENCOUNTER — OFFICE (OUTPATIENT)
Dept: URBAN - METROPOLITAN AREA CLINIC 35 | Facility: CLINIC | Age: 62
Setting detail: OPHTHALMOLOGY
End: 2022-12-17
Payer: COMMERCIAL

## 2022-12-17 DIAGNOSIS — H04.563: ICD-10-CM

## 2022-12-17 PROBLEM — H01.00 BLEPHARITIS; RIGHT UPPER LID, LEFT UPPER LID: Status: ACTIVE | Noted: 2022-12-17

## 2022-12-17 PROBLEM — H02.532 FLOPPY EYELID RETRACTION; RIGHT LOWER LID, LEFT LOWER LID: Status: ACTIVE | Noted: 2022-11-03

## 2022-12-17 PROBLEM — H02.535 FLOPPY EYELID RETRACTION; RIGHT LOWER LID, LEFT LOWER LID: Status: ACTIVE | Noted: 2022-11-03

## 2022-12-17 PROCEDURE — 99024 POSTOP FOLLOW-UP VISIT: CPT | Performed by: OPHTHALMOLOGY

## 2022-12-17 ASSESSMENT — REFRACTION_AUTOREFRACTION
OD_CYLINDER: -1.25
OD_SPHERE: +0.50
OS_AXIS: 012
OS_CYLINDER: -1.25
OS_SPHERE: -0.25
OD_AXIS: 127

## 2022-12-17 ASSESSMENT — KERATOMETRY
OS_AXISANGLE_DEGREES: 109
OS_K1POWER_DIOPTERS: 44.25
OD_AXISANGLE_DEGREES: 065
METHOD_AUTO_MANUAL: AUTO
OD_K1POWER_DIOPTERS: 43.50
OS_K2POWER_DIOPTERS: 46.50
OD_K2POWER_DIOPTERS: 44.75

## 2022-12-17 ASSESSMENT — AXIALLENGTH_DERIVED
OS_AL: 23.2492
OD_AL: 23.4124

## 2022-12-17 ASSESSMENT — LID EXAM ASSESSMENTS
OD_BLEPHARITIS: 1+
OS_BLEPHARITIS: 1+
OD_COMMENTS: 1+ LAXIT
OS_COMMENTS: 1+ LAXIT

## 2022-12-17 ASSESSMENT — TEAR BREAK UP TIME (TBUT)
OS_TBUT: T 1+
OD_TBUT: 3+

## 2022-12-17 ASSESSMENT — SPHEQUIV_DERIVED
OD_SPHEQUIV: -0.125
OS_SPHEQUIV: -0.875

## 2022-12-17 ASSESSMENT — PUNCTA - ASSESSMENT
OD_PUNCTA: MED
OS_PUNCTA: LARGE

## 2022-12-17 ASSESSMENT — VISUAL ACUITY
OD_BCVA: 20/20-3
OS_BCVA: 20/40

## 2022-12-17 ASSESSMENT — CONFRONTATIONAL VISUAL FIELD TEST (CVF)
OS_FINDINGS: FULL
OD_FINDINGS: FULL

## 2023-02-08 ENCOUNTER — OFFICE (OUTPATIENT)
Dept: URBAN - METROPOLITAN AREA CLINIC 102 | Facility: CLINIC | Age: 63
Setting detail: OPHTHALMOLOGY
End: 2023-02-08
Payer: COMMERCIAL

## 2023-02-08 DIAGNOSIS — H52.4: ICD-10-CM

## 2023-02-08 DIAGNOSIS — H43.393: ICD-10-CM

## 2023-02-08 DIAGNOSIS — H01.004: ICD-10-CM

## 2023-02-08 DIAGNOSIS — H01.001: ICD-10-CM

## 2023-02-08 DIAGNOSIS — H01.002: ICD-10-CM

## 2023-02-08 DIAGNOSIS — H01.005: ICD-10-CM

## 2023-02-08 DIAGNOSIS — H25.13: ICD-10-CM

## 2023-02-08 PROBLEM — H52.7 REFRACTIVE ERROR: Status: ACTIVE | Noted: 2023-02-08

## 2023-02-08 PROCEDURE — 92014 COMPRE OPH EXAM EST PT 1/>: CPT | Performed by: OPHTHALMOLOGY

## 2023-02-08 PROCEDURE — 92015 DETERMINE REFRACTIVE STATE: CPT | Performed by: OPHTHALMOLOGY

## 2023-02-08 PROCEDURE — 92020 GONIOSCOPY: CPT | Performed by: OPHTHALMOLOGY

## 2023-02-08 ASSESSMENT — AXIALLENGTH_DERIVED
OD_AL: 23.3618
OD_AL: 23.4577
OS_AL: 23.5489
OS_AL: 23.4042

## 2023-02-08 ASSESSMENT — VISUAL ACUITY
OS_BCVA: 20/30
OD_BCVA: 20/20

## 2023-02-08 ASSESSMENT — KERATOMETRY
METHOD_AUTO_MANUAL: AUTO
OS_K2POWER_DIOPTERS: 43.75
OS_AXISANGLE_DEGREES: 090
OS_K1POWER_DIOPTERS: 43.75
OD_K1POWER_DIOPTERS: 43.50
OD_AXISANGLE_DEGREES: 062
OD_K2POWER_DIOPTERS: 44.50

## 2023-02-08 ASSESSMENT — SPHEQUIV_DERIVED
OD_SPHEQUIV: 0.125
OS_SPHEQUIV: 0.25
OD_SPHEQUIV: -0.125
OS_SPHEQUIV: -0.125

## 2023-02-08 ASSESSMENT — REFRACTION_MANIFEST
OS_SPHERE: +0.25
OD_VA1: 20/25
OS_CYLINDER: -0.75
OD_SPHERE: +0.50
OS_ADD: +2.50
OD_AXIS: 120
OS_VA1: 20/25
OS_AXIS: 90
OD_CYLINDER: -1.25
OD_ADD: +2.50

## 2023-02-08 ASSESSMENT — REFRACTION_AUTOREFRACTION
OD_AXIS: 130
OD_SPHERE: +0.75
OD_CYLINDER: -1.25
OS_SPHERE: +0.50
OS_CYLINDER: -0.50
OS_AXIS: 081

## 2023-02-08 ASSESSMENT — CONFRONTATIONAL VISUAL FIELD TEST (CVF)
OD_FINDINGS: FULL
OS_FINDINGS: FULL

## 2023-02-08 ASSESSMENT — TONOMETRY
OS_IOP_MMHG: 13
OD_IOP_MMHG: 12

## 2023-02-08 ASSESSMENT — PUNCTA - ASSESSMENT
OS_PUNCTA: LARGE
OD_PUNCTA: MED

## 2023-02-08 ASSESSMENT — LID EXAM ASSESSMENTS
OS_BLEPHARITIS: 1+
OD_BLEPHARITIS: 1+
OS_COMMENTS: 1+ LAXIT
OD_COMMENTS: 1+ LAXIT

## 2023-02-08 ASSESSMENT — TEAR BREAK UP TIME (TBUT)
OS_TBUT: T 1+
OD_TBUT: 3+

## 2023-04-30 NOTE — ED PROVIDER NOTE - CARE PLAN
DATE OF SERVICE: 04-30-23 @ 17:46  CHIEF COMPLAINT:Patient is a 70y old  Male who presents with a chief complaint of Per chart "Pt. is a 70 year old Male with PMHx of advanced CKD, HTN, HLD, DM2 well known to our service from prior admission presents at [facility] on 4/27 for hyperkalemia noted on outpatient labs."     (30 Apr 2023 14:14)    	        PAST MEDICAL & SURGICAL HISTORY:  Hypertension      Diabetes      Stage 5 chronic kidney disease not on chronic dialysis      HLD (hyperlipidemia)              REVIEW OF SYSTEMS:  CONSTITUTIONAL: No fever, weight loss, or fatigue  EYES: No eye pain, visual disturbances, or discharge  NECK: No pain or stiffness  RESPIRATORY: No cough, wheezing, chills or hemoptysis; No Shortness of Breath  CARDIOVASCULAR: No chest pain, palpitations, passing out, dizziness, or leg swelling  GASTROINTESTINAL: No abdominal or epigastric pain. No nausea, vomiting, or hematemesis; No diarrhea or constipation. No melena or hematochezia.  GENITOURINARY: No dysuria, frequency, hematuria, or incontinence  NEUROLOGICAL: No headaches,   Medications:  MEDICATIONS  (STANDING):  amLODIPine   Tablet 10 milliGRAM(s) Oral daily  aspirin enteric coated 81 milliGRAM(s) Oral daily  atorvastatin 40 milliGRAM(s) Oral at bedtime  carvedilol 12.5 milliGRAM(s) Oral every 12 hours  chlorhexidine 2% Cloths 1 Application(s) Topical daily  citric acid/sodium citrate Solution 30 milliLiter(s) Oral three times a day  dextrose 5%. 1000 milliLiter(s) (100 mL/Hr) IV Continuous <Continuous>  dextrose 5%. 1000 milliLiter(s) (50 mL/Hr) IV Continuous <Continuous>  dextrose 50% Injectable 25 Gram(s) IV Push once  dextrose 50% Injectable 25 Gram(s) IV Push once  dextrose 50% Injectable 12.5 Gram(s) IV Push once  epoetin rose-epbx (RETACRIT) Injectable 4000 Unit(s) SubCutaneous every 7 days  glucagon  Injectable 1 milliGRAM(s) IntraMuscular once  heparin   Injectable 5000 Unit(s) SubCutaneous every 12 hours  insulin lispro (ADMELOG) corrective regimen sliding scale   SubCutaneous three times a day before meals  levothyroxine 50 MICROGram(s) Oral daily  sodium bicarbonate 1300 milliGRAM(s) Oral two times a day  sodium zirconium cyclosilicate 10 Gram(s) Oral two times a day    MEDICATIONS  (PRN):  dextrose Oral Gel 15 Gram(s) Oral once PRN Blood Glucose LESS THAN 70 milliGRAM(s)/deciliter    	    PHYSICAL EXAM:  T(C): 36.7 (04-30-23 @ 11:15), Max: 36.8 (04-30-23 @ 05:11)  HR: 69 (04-30-23 @ 16:46) (65 - 69)  BP: 127/62 (04-30-23 @ 16:46) (101/61 - 136/56)  RR: 18 (04-30-23 @ 11:15) (18 - 18)  SpO2: 99% (04-30-23 @ 11:15) (97% - 100%)  Wt(kg): --  I&O's Summary    29 Apr 2023 07:01  -  30 Apr 2023 07:00  --------------------------------------------------------  IN: 1220 mL / OUT: 2150 mL / NET: -930 mL    30 Apr 2023 07:01  -  30 Apr 2023 17:46  --------------------------------------------------------  IN: 630 mL / OUT: 900 mL / NET: -270 mL        Appearance: Normal	  HEENT:   Normal oral mucosa, PERRL, EOMI	  Lymphatic: No lymphadenopathy  Cardiovascular: Normal S1 S2, No JVD, No murmurs, No edema  Respiratory: Lungs clear to auscultation	  Psychiatry: A & O   Gastrointestinal:  Soft, Non-tender, + BS	  Skin: No rashes, No ecchymoses, No cyanosis	  Neurologic: Non-focal  Extremities: Normal range of motion, No clubbing, cyanosis or edema  Vascular: Peripheral pulses palpable 2+ bilaterally    TELEMETRY: 	    ECG:  	  RADIOLOGY:  OTHER: 	  	  LABS:	 	    CARDIAC MARKERS:                                7.8    9.52  )-----------( 212      ( 30 Apr 2023 06:45 )             24.2     04-30    140  |  105  |  83<H>  ----------------------------<  107<H>  4.8   |  20<L>  |  4.26<H>    Ca    8.6      30 Apr 2023 06:44    TPro  6.8  /  Alb  3.6  /  TBili  0.3  /  DBili  x   /  AST  42<H>  /  ALT  82<H>  /  AlkPhos  72  04-30    proBNP:   Lipid Profile:   HgA1c:   TSH:     	         Principal Discharge DX:	Palpitations  Secondary Diagnosis:	Tachycardia

## 2023-05-16 ENCOUNTER — APPOINTMENT (OUTPATIENT)
Dept: OTOLARYNGOLOGY | Facility: CLINIC | Age: 63
End: 2023-05-16

## 2023-06-07 ENCOUNTER — APPOINTMENT (OUTPATIENT)
Dept: OTOLARYNGOLOGY | Facility: CLINIC | Age: 63
End: 2023-06-07
Payer: COMMERCIAL

## 2023-06-07 VITALS — BODY MASS INDEX: 28.41 KG/M2 | HEIGHT: 67 IN | WEIGHT: 181 LBS | TEMPERATURE: 97.6 F

## 2023-06-07 DIAGNOSIS — H90.3 SENSORINEURAL HEARING LOSS, BILATERAL: ICD-10-CM

## 2023-06-07 DIAGNOSIS — H69.83 OTHER SPECIFIED DISORDERS OF EUSTACHIAN TUBE, BILATERAL: ICD-10-CM

## 2023-06-07 DIAGNOSIS — J37.0 CHRONIC LARYNGITIS: ICD-10-CM

## 2023-06-07 PROCEDURE — 92557 COMPREHENSIVE HEARING TEST: CPT

## 2023-06-07 PROCEDURE — 31575 DIAGNOSTIC LARYNGOSCOPY: CPT

## 2023-06-07 PROCEDURE — 99213 OFFICE O/P EST LOW 20 MIN: CPT | Mod: 25

## 2023-06-07 PROCEDURE — 92567 TYMPANOMETRY: CPT

## 2023-06-07 NOTE — PROCEDURE
[de-identified] : Indication for procedure:Unable to examine laryngeal structures with mirror exam.  Patient with excessive mucous in throat and fullness.\par The patient has voice issues\par \par scope # 47\par Topical anesthesia is established with viscous xylocaine 2%.\par A flexible fiberoptic laryngoscope is introduced through the nares.\par No masses or inflammation is noted in the nasopharynx.\par The tongue base and valleculae are clear.\par The posterior pharyngeal wall is negative.  The hypopharynx is unobstructed.\par The supraglottic larynx is unremarkable.\par Both vocal cords are fully mobile without any polyp or nodule seen.  The vocal cords have no diffuse edema.\par There is no edema overlying the arytenoid cartilages and inter-arytenoid space.\par Milderythema is noted the posterior larynx.\par The subglottic space is clear.\par The voice has a  normal quality.\par

## 2023-06-07 NOTE — ASSESSMENT
[FreeTextEntry1] : exam larynx unremarkable\par audio au loss  worse on rt stable from last audio\par reviewed prior upper endo-wnl\par fu 2 y

## 2023-11-06 ENCOUNTER — APPOINTMENT (OUTPATIENT)
Dept: ORTHOPEDIC SURGERY | Facility: CLINIC | Age: 63
End: 2023-11-06
Payer: MEDICARE

## 2023-11-06 VITALS — WEIGHT: 190 LBS | HEIGHT: 67 IN | BODY MASS INDEX: 29.82 KG/M2

## 2023-11-06 DIAGNOSIS — M25.559 PAIN IN UNSPECIFIED HIP: ICD-10-CM

## 2023-11-06 DIAGNOSIS — M25.561 PAIN IN RIGHT KNEE: ICD-10-CM

## 2023-11-06 DIAGNOSIS — M70.61 TROCHANTERIC BURSITIS, RIGHT HIP: ICD-10-CM

## 2023-11-06 DIAGNOSIS — M25.562 PAIN IN RIGHT KNEE: ICD-10-CM

## 2023-11-06 DIAGNOSIS — M70.62 TROCHANTERIC BURSITIS, RIGHT HIP: ICD-10-CM

## 2023-11-06 PROCEDURE — 73564 X-RAY EXAM KNEE 4 OR MORE: CPT | Mod: 50

## 2023-11-06 PROCEDURE — 99204 OFFICE O/P NEW MOD 45 MIN: CPT

## 2023-11-06 PROCEDURE — 73502 X-RAY EXAM HIP UNI 2-3 VIEWS: CPT

## 2023-11-06 RX ORDER — LIRAGLUTIDE 6 MG/ML
INJECTION, SOLUTION SUBCUTANEOUS
Refills: 0 | Status: ACTIVE | COMMUNITY

## 2023-11-29 ENCOUNTER — APPOINTMENT (OUTPATIENT)
Dept: OBGYN | Facility: CLINIC | Age: 63
End: 2023-11-29
Payer: MEDICARE

## 2023-11-29 ENCOUNTER — OFFICE (OUTPATIENT)
Dept: URBAN - METROPOLITAN AREA CLINIC 100 | Facility: CLINIC | Age: 63
Setting detail: OPHTHALMOLOGY
End: 2023-11-29
Payer: MEDICARE

## 2023-11-29 ENCOUNTER — RX ONLY (RX ONLY)
Age: 63
End: 2023-11-29

## 2023-11-29 VITALS
WEIGHT: 199 LBS | BODY MASS INDEX: 31.23 KG/M2 | SYSTOLIC BLOOD PRESSURE: 118 MMHG | HEIGHT: 67 IN | DIASTOLIC BLOOD PRESSURE: 72 MMHG

## 2023-11-29 DIAGNOSIS — H01.002: ICD-10-CM

## 2023-11-29 DIAGNOSIS — H04.123: ICD-10-CM

## 2023-11-29 DIAGNOSIS — H01.001: ICD-10-CM

## 2023-11-29 DIAGNOSIS — H00.15: ICD-10-CM

## 2023-11-29 DIAGNOSIS — H01.005: ICD-10-CM

## 2023-11-29 DIAGNOSIS — Z01.419 ENCOUNTER FOR GYNECOLOGICAL EXAMINATION (GENERAL) (ROUTINE) W/OUT ABNORMAL FINDINGS: ICD-10-CM

## 2023-11-29 DIAGNOSIS — H01.004: ICD-10-CM

## 2023-11-29 PROCEDURE — 99397 PER PM REEVAL EST PAT 65+ YR: CPT | Mod: GY

## 2023-11-29 PROCEDURE — 92012 INTRM OPH EXAM EST PATIENT: CPT | Performed by: OPHTHALMOLOGY

## 2023-11-29 ASSESSMENT — REFRACTION_AUTOREFRACTION
OD_AXIS: 134
OS_SPHERE: +0.25
OD_CYLINDER: -1.50
OD_SPHERE: +0.75
OS_CYLINDER: -0.50
OS_AXIS: 078

## 2023-11-29 ASSESSMENT — TEAR BREAK UP TIME (TBUT)
OD_TBUT: 3+
OS_TBUT: T 1+

## 2023-11-29 ASSESSMENT — REFRACTION_MANIFEST
OD_SPHERE: +0.50
OS_AXIS: 90
OD_VA1: 20/25
OS_SPHERE: +0.25
OS_CYLINDER: -0.75
OD_ADD: +2.50
OD_CYLINDER: -1.25
OS_VA1: 20/25
OD_AXIS: 120
OS_ADD: +2.50

## 2023-11-29 ASSESSMENT — CONFRONTATIONAL VISUAL FIELD TEST (CVF)
OD_FINDINGS: FULL
OS_FINDINGS: FULL

## 2023-11-29 ASSESSMENT — SPHEQUIV_DERIVED
OS_SPHEQUIV: -0.125
OD_SPHEQUIV: -0.125
OD_SPHEQUIV: 0
OS_SPHEQUIV: 0

## 2023-11-29 ASSESSMENT — LID EXAM ASSESSMENTS
OD_BLEPHARITIS: 1+
OS_BLEPHARITIS: 1+
OD_COMMENTS: 1+ LAXIT
OS_COMMENTS: 1+ LAXIT

## 2023-11-29 ASSESSMENT — PUNCTA - ASSESSMENT
OD_PUNCTA: MED
OS_PUNCTA: LARGE

## 2023-12-03 LAB
CYTOLOGY CVX/VAG DOC THIN PREP: NORMAL
HPV HIGH+LOW RISK DNA PNL CVX: NOT DETECTED

## 2023-12-20 ENCOUNTER — APPOINTMENT (OUTPATIENT)
Dept: ORTHOPEDIC SURGERY | Facility: CLINIC | Age: 63
End: 2023-12-20
Payer: MEDICARE

## 2023-12-20 VITALS — BODY MASS INDEX: 31.23 KG/M2 | WEIGHT: 199 LBS | HEIGHT: 67 IN

## 2023-12-20 PROCEDURE — 99213 OFFICE O/P EST LOW 20 MIN: CPT

## 2023-12-20 NOTE — HISTORY OF PRESENT ILLNESS
[Dull/Aching] : dull/aching [Radiating] : radiating [Sharp] : sharp [Meds] : meds [FreeTextEntry5] : 64 y/o F presents for f/u eval of both knees and Rt. hip today. Pt reports of improvement since last visit with minimal pain. PT 2x weekly. Tylenol or Advil as needed. Swelling persists.

## 2023-12-20 NOTE — ASSESSMENT
[FreeTextEntry1] : The patient is here for bilateral knee follow up. She has started PT from her last visit and has had excellent relief so far. She denies new trauma. She feels her pain has decreased and stength has increased. She has pain with ambulation and using stairs. The patient did have knee pain prior to the fall that was exacerbated during it as well. She denies pain with getting in and out of cars. She has pain with walking up and down inclines. The patient reports her pain is constant and moderate. It has become worse to the knees than hips today.   B/L knee exam: Well appearing female in no apparent distress. No rashes, scars, or abrasions. Neurovascularly intact. Tender to palpation at medial joint lines. Ranging from 0-120 bilaterally with pain at EROM flexion. No varus/valgus deformity. Anterior/posterior drawer negative, - Mcmurrays. - Lachmans.   The patient does not wish to pursue gel injections today. She will continue PT and was given a new rx. She will return as needed for gel injections.

## 2023-12-23 ENCOUNTER — RESULT REVIEW (OUTPATIENT)
Age: 63
End: 2023-12-23

## 2023-12-23 ENCOUNTER — APPOINTMENT (OUTPATIENT)
Dept: ULTRASOUND IMAGING | Facility: CLINIC | Age: 63
End: 2023-12-23
Payer: MEDICARE

## 2023-12-23 ENCOUNTER — OUTPATIENT (OUTPATIENT)
Dept: OUTPATIENT SERVICES | Facility: HOSPITAL | Age: 63
LOS: 1 days | End: 2023-12-23
Payer: MEDICARE

## 2023-12-23 ENCOUNTER — APPOINTMENT (OUTPATIENT)
Dept: MAMMOGRAPHY | Facility: CLINIC | Age: 63
End: 2023-12-23
Payer: MEDICARE

## 2023-12-23 DIAGNOSIS — Z12.31 ENCOUNTER FOR SCREENING MAMMOGRAM FOR MALIGNANT NEOPLASM OF BREAST: ICD-10-CM

## 2023-12-23 PROCEDURE — 77063 BREAST TOMOSYNTHESIS BI: CPT

## 2023-12-23 PROCEDURE — 76641 ULTRASOUND BREAST COMPLETE: CPT

## 2023-12-23 PROCEDURE — 77063 BREAST TOMOSYNTHESIS BI: CPT | Mod: 26

## 2023-12-23 PROCEDURE — 76641 ULTRASOUND BREAST COMPLETE: CPT | Mod: 26,50,GY

## 2023-12-23 PROCEDURE — 77067 SCR MAMMO BI INCL CAD: CPT | Mod: 26

## 2023-12-23 PROCEDURE — 77067 SCR MAMMO BI INCL CAD: CPT

## 2024-02-07 ENCOUNTER — APPOINTMENT (OUTPATIENT)
Dept: ORTHOPEDIC SURGERY | Facility: CLINIC | Age: 64
End: 2024-02-07
Payer: MEDICARE

## 2024-02-07 VITALS — WEIGHT: 199 LBS | HEIGHT: 67 IN | BODY MASS INDEX: 31.23 KG/M2

## 2024-02-07 DIAGNOSIS — M17.12 UNILATERAL PRIMARY OSTEOARTHRITIS, LEFT KNEE: ICD-10-CM

## 2024-02-07 DIAGNOSIS — M17.11 UNILATERAL PRIMARY OSTEOARTHRITIS, RIGHT KNEE: ICD-10-CM

## 2024-02-07 PROCEDURE — 99213 OFFICE O/P EST LOW 20 MIN: CPT

## 2024-02-08 PROBLEM — M17.11 PRIMARY OSTEOARTHRITIS OF RIGHT KNEE: Status: ACTIVE | Noted: 2023-11-06

## 2024-02-08 PROBLEM — M17.12 PRIMARY OSTEOARTHRITIS OF LEFT KNEE: Status: ACTIVE | Noted: 2023-11-06

## 2024-02-08 NOTE — ASSESSMENT
[FreeTextEntry1] : The patient is here for bilateral knee follow up. The patient continues to have excellent relief from PT and acupuncture. She is unsure about further treatment. She denies new trauma. She feels her pain has decreased and stength has increased. She has minimal pain down stairs. She denies pain with getting in and out of cars. The patient's pain is minimal and intermittent. She has pain with overuse.  B/L knee exam: Well appearing female in no apparent distress. No rashes, scars, or abrasions. Neurovascularly intact. Tender to palpation at medial joint lines. Ranging from 0-120 bilaterally with pain at EROM flexion. No varus/valgus deformity. Anterior/posterior drawer negative, - Mcmurrays. - Lachmans.   The patient does not wish to pursue gel injections today. She will continue PT and was given a new rx. She will continue with acupuncture as well. She will return as needed for gel injections.

## 2024-02-08 NOTE — HISTORY OF PRESENT ILLNESS
[Dull/Aching] : dull/aching [Radiating] : radiating [Sharp] : sharp [Meds] : meds [Physical therapy] : physical therapy [FreeTextEntry5] : 62 y/o F presents for f/u eval of both knees today. Pt reports of improvement since last visit. Some swelling persists. PT 2x weekly.

## 2024-02-12 ENCOUNTER — OFFICE (OUTPATIENT)
Dept: URBAN - METROPOLITAN AREA CLINIC 102 | Facility: CLINIC | Age: 64
Setting detail: OPHTHALMOLOGY
End: 2024-02-12
Payer: MEDICARE

## 2024-02-12 DIAGNOSIS — H01.001: ICD-10-CM

## 2024-02-12 DIAGNOSIS — H01.002: ICD-10-CM

## 2024-02-12 DIAGNOSIS — H01.004: ICD-10-CM

## 2024-02-12 DIAGNOSIS — H16.223: ICD-10-CM

## 2024-02-12 DIAGNOSIS — H52.4: ICD-10-CM

## 2024-02-12 DIAGNOSIS — H25.13: ICD-10-CM

## 2024-02-12 DIAGNOSIS — H01.005: ICD-10-CM

## 2024-02-12 DIAGNOSIS — H43.393: ICD-10-CM

## 2024-02-12 PROCEDURE — 92015 DETERMINE REFRACTIVE STATE: CPT | Performed by: OPHTHALMOLOGY

## 2024-02-12 PROCEDURE — 92014 COMPRE OPH EXAM EST PT 1/>: CPT | Performed by: OPHTHALMOLOGY

## 2024-02-12 ASSESSMENT — SPHEQUIV_DERIVED
OS_SPHEQUIV: -0.125
OS_SPHEQUIV: 0.125
OD_SPHEQUIV: 0.25
OD_SPHEQUIV: -0.125
OD_SPHEQUIV: 0.125

## 2024-02-12 ASSESSMENT — CONFRONTATIONAL VISUAL FIELD TEST (CVF)
OS_FINDINGS: FULL
OD_FINDINGS: FULL

## 2024-02-12 ASSESSMENT — LID EXAM ASSESSMENTS
OD_COMMENTS: 1+ LAXIT
OS_BLEPHARITIS: 1+
OD_BLEPHARITIS: 1+
OS_COMMENTS: 1+ LAXIT

## 2024-02-12 ASSESSMENT — REFRACTION_MANIFEST
OS_ADD: +2.50
OS_VA1: 20/25
OD_VA1: 20/20-
OS_SPHERE: PLANO
OS_CYLINDER: -0.75
OD_ADD: +2.50
OD_AXIS: 140
OD_VA1: 20/25
OD_ADD: +2.50
OD_SPHERE: +0.50
OD_SPHERE: +0.75
OD_AXIS: 120
OS_SPHERE: +0.25
OS_AXIS: 90
OD_CYLINDER: -1.25
OS_ADD: +2.50
OD_CYLINDER: -1.25
OS_VA1: 20/20

## 2024-02-12 ASSESSMENT — REFRACTION_AUTOREFRACTION
OD_AXIS: 143
OD_SPHERE: +0.75
OS_CYLINDER: -0.25
OS_SPHERE: +0.25
OD_CYLINDER: -1.00
OS_AXIS: 75

## 2024-02-12 ASSESSMENT — PUNCTA - ASSESSMENT
OD_PUNCTA: MED
OS_PUNCTA: LARGE

## 2024-02-20 ENCOUNTER — APPOINTMENT (OUTPATIENT)
Dept: ULTRASOUND IMAGING | Facility: CLINIC | Age: 64
End: 2024-02-20
Payer: MEDICARE

## 2024-02-20 ENCOUNTER — OUTPATIENT (OUTPATIENT)
Dept: OUTPATIENT SERVICES | Facility: HOSPITAL | Age: 64
LOS: 1 days | End: 2024-02-20
Payer: MEDICARE

## 2024-02-20 DIAGNOSIS — E04.1 NONTOXIC SINGLE THYROID NODULE: ICD-10-CM

## 2024-02-20 DIAGNOSIS — Z00.8 ENCOUNTER FOR OTHER GENERAL EXAMINATION: ICD-10-CM

## 2024-02-20 PROCEDURE — 76536 US EXAM OF HEAD AND NECK: CPT | Mod: 26

## 2024-02-20 PROCEDURE — 76536 US EXAM OF HEAD AND NECK: CPT

## 2024-08-30 NOTE — ED ADULT NURSE NOTE - CCCP TRG CHIEF CMPLNT
rapid heart beat Admission Reconciliation is Completed  Discharge Reconciliation is Completed Admission Reconciliation is Completed  Discharge Reconciliation is Not Complete

## 2024-12-02 ENCOUNTER — APPOINTMENT (OUTPATIENT)
Dept: OBGYN | Facility: CLINIC | Age: 64
End: 2024-12-02
Payer: MEDICARE

## 2024-12-02 DIAGNOSIS — Z01.419 ENCOUNTER FOR GYNECOLOGICAL EXAMINATION (GENERAL) (ROUTINE) W/OUT ABNORMAL FINDINGS: ICD-10-CM

## 2024-12-02 PROCEDURE — G0101: CPT

## 2024-12-03 LAB — HPV HIGH+LOW RISK DNA PNL CVX: NOT DETECTED

## 2024-12-09 LAB — CYTOLOGY CVX/VAG DOC THIN PREP: ABNORMAL

## 2025-01-15 ENCOUNTER — RESULT CHARGE (OUTPATIENT)
Age: 65
End: 2025-01-15

## 2025-01-15 ENCOUNTER — APPOINTMENT (OUTPATIENT)
Facility: CLINIC | Age: 65
End: 2025-01-15
Payer: MEDICARE

## 2025-01-15 DIAGNOSIS — M17.11 UNILATERAL PRIMARY OSTEOARTHRITIS, RIGHT KNEE: ICD-10-CM

## 2025-01-15 DIAGNOSIS — M17.12 UNILATERAL PRIMARY OSTEOARTHRITIS, LEFT KNEE: ICD-10-CM

## 2025-01-15 PROCEDURE — 20611 DRAIN/INJ JOINT/BURSA W/US: CPT | Mod: 50

## 2025-01-15 PROCEDURE — 99213 OFFICE O/P EST LOW 20 MIN: CPT | Mod: 25

## 2025-01-15 PROCEDURE — 73564 X-RAY EXAM KNEE 4 OR MORE: CPT | Mod: 50

## 2025-01-15 PROCEDURE — J3490M: CUSTOM | Mod: NC,JZ

## 2025-01-15 RX ORDER — METOPROLOL SUCCINATE 50 MG/1
50 TABLET, EXTENDED RELEASE ORAL
Refills: 0 | Status: ACTIVE | COMMUNITY

## 2025-01-15 RX ORDER — PREDNISONE 10 MG/1
10 TABLET ORAL
Qty: 14 | Refills: 0 | Status: ACTIVE | COMMUNITY
Start: 2025-01-15 | End: 1900-01-01

## 2025-02-18 ENCOUNTER — OFFICE (OUTPATIENT)
Dept: URBAN - METROPOLITAN AREA CLINIC 102 | Facility: CLINIC | Age: 65
Setting detail: OPHTHALMOLOGY
End: 2025-02-18
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
## 2025-02-18 PROCEDURE — NO SHOW FE NO SHOW FEE: Performed by: OPHTHALMOLOGY

## 2025-02-24 ENCOUNTER — APPOINTMENT (OUTPATIENT)
Dept: UROGYNECOLOGY | Facility: CLINIC | Age: 65
End: 2025-02-24

## 2025-02-24 VITALS
HEART RATE: 63 BPM | WEIGHT: 200 LBS | BODY MASS INDEX: 31.39 KG/M2 | SYSTOLIC BLOOD PRESSURE: 134 MMHG | DIASTOLIC BLOOD PRESSURE: 72 MMHG | HEIGHT: 67 IN | OXYGEN SATURATION: 97 %

## 2025-02-24 DIAGNOSIS — N32.81 OVERACTIVE BLADDER: ICD-10-CM

## 2025-02-24 LAB
BILIRUB UR QL STRIP: NEGATIVE
CLARITY UR: CLEAR
COLLECTION METHOD: NORMAL
GLUCOSE UR-MCNC: NEGATIVE
HCG UR QL: 0.2 EU/DL
HGB UR QL STRIP.AUTO: NEGATIVE
KETONES UR-MCNC: NEGATIVE
LEUKOCYTE ESTERASE UR QL STRIP: NEGATIVE
NITRITE UR QL STRIP: NEGATIVE
PH UR STRIP: 7
PROT UR STRIP-MCNC: NEGATIVE
SP GR UR STRIP: 1.01

## 2025-02-24 PROCEDURE — 99204 OFFICE O/P NEW MOD 45 MIN: CPT | Mod: 25

## 2025-02-24 PROCEDURE — 99459 PELVIC EXAMINATION: CPT

## 2025-02-24 PROCEDURE — 51701 INSERT BLADDER CATHETER: CPT | Mod: 59

## 2025-02-24 PROCEDURE — 81003 URINALYSIS AUTO W/O SCOPE: CPT | Mod: QW

## 2025-02-24 RX ORDER — VIBEGRON 75 MG/1
75 TABLET, FILM COATED ORAL
Qty: 90 | Refills: 0 | Status: ACTIVE | COMMUNITY
Start: 2025-02-24 | End: 1900-01-01

## 2025-03-20 ENCOUNTER — APPOINTMENT (OUTPATIENT)
Dept: OBGYN | Facility: CLINIC | Age: 65
End: 2025-03-20
Payer: MEDICARE

## 2025-03-20 ENCOUNTER — LABORATORY RESULT (OUTPATIENT)
Age: 65
End: 2025-03-20

## 2025-03-20 VITALS
HEIGHT: 67 IN | RESPIRATION RATE: 16 BRPM | DIASTOLIC BLOOD PRESSURE: 82 MMHG | HEART RATE: 66 BPM | WEIGHT: 203 LBS | SYSTOLIC BLOOD PRESSURE: 130 MMHG | BODY MASS INDEX: 31.86 KG/M2

## 2025-03-20 DIAGNOSIS — N90.89 OTHER SPECIFIED NONINFLAMMATORY DISORDERS OF VULVA AND PERINEUM: ICD-10-CM

## 2025-03-20 PROCEDURE — 56605 BIOPSY OF VULVA/PERINEUM: CPT

## 2025-03-20 RX ORDER — CEPHALEXIN 500 MG/1
500 TABLET ORAL
Qty: 10 | Refills: 0 | Status: ACTIVE | COMMUNITY
Start: 2025-03-20 | End: 1900-01-01

## 2025-03-24 ENCOUNTER — APPOINTMENT (OUTPATIENT)
Dept: UROGYNECOLOGY | Facility: CLINIC | Age: 65
End: 2025-03-24

## 2025-03-24 VITALS
BODY MASS INDEX: 31.39 KG/M2 | OXYGEN SATURATION: 99 % | HEART RATE: 65 BPM | HEIGHT: 67 IN | WEIGHT: 200 LBS | DIASTOLIC BLOOD PRESSURE: 72 MMHG | SYSTOLIC BLOOD PRESSURE: 122 MMHG

## 2025-03-24 LAB
BILIRUB UR QL STRIP: NEGATIVE
COLLECTION METHOD: NORMAL
GLUCOSE UR-MCNC: NEGATIVE
HCG UR QL: 0.2 EU/DL
HGB UR QL STRIP.AUTO: NEGATIVE
KETONES UR-MCNC: NEGATIVE
LEUKOCYTE ESTERASE UR QL STRIP: NEGATIVE
NITRITE UR QL STRIP: NEGATIVE
PH UR STRIP: 6
PROT UR STRIP-MCNC: NEGATIVE
SP GR UR STRIP: <=1.005

## 2025-03-24 PROCEDURE — 81003 URINALYSIS AUTO W/O SCOPE: CPT | Mod: QW

## 2025-03-24 PROCEDURE — 99214 OFFICE O/P EST MOD 30 MIN: CPT

## 2025-03-27 ENCOUNTER — OFFICE (OUTPATIENT)
Dept: URBAN - METROPOLITAN AREA CLINIC 102 | Facility: CLINIC | Age: 65
Setting detail: OPHTHALMOLOGY
End: 2025-03-27
Payer: MEDICARE

## 2025-03-27 DIAGNOSIS — H01.002: ICD-10-CM

## 2025-03-27 DIAGNOSIS — H16.223: ICD-10-CM

## 2025-03-27 DIAGNOSIS — H01.001: ICD-10-CM

## 2025-03-27 DIAGNOSIS — H43.393: ICD-10-CM

## 2025-03-27 DIAGNOSIS — H01.004: ICD-10-CM

## 2025-03-27 DIAGNOSIS — H25.13: ICD-10-CM

## 2025-03-27 DIAGNOSIS — H01.005: ICD-10-CM

## 2025-03-27 DIAGNOSIS — H52.4: ICD-10-CM

## 2025-03-27 PROCEDURE — 92014 COMPRE OPH EXAM EST PT 1/>: CPT | Performed by: OPHTHALMOLOGY

## 2025-03-27 PROCEDURE — 92015 DETERMINE REFRACTIVE STATE: CPT | Performed by: OPHTHALMOLOGY

## 2025-03-27 ASSESSMENT — REFRACTION_AUTOREFRACTION
OS_CYLINDER: -0.25
OD_CYLINDER: -1.00
OS_AXIS: 063
OD_AXIS: 135
OS_SPHERE: +0.50
OD_SPHERE: +0.75

## 2025-03-27 ASSESSMENT — REFRACTION_MANIFEST
OD_SPHERE: +0.50
OD_CYLINDER: -1.25
OS_VA1: 20/20
OD_CYLINDER: -1.00
OD_SPHERE: +0.75
OS_ADD: +2.50
OD_VA1: 20/20-
OS_VA1: 20/20
OD_ADD: +2.50
OD_VA1: 20/20
OD_AXIS: 135
OS_ADD: +2.50
OS_SPHERE: PLANO
OD_AXIS: 140
OS_CYLINDER: SPH
OS_SPHERE: +0.25
OD_ADD: +2.50

## 2025-03-27 ASSESSMENT — KERATOMETRY
OS_K1POWER_DIOPTERS: 43.50
OS_AXISANGLE_DEGREES: 116
OD_K1POWER_DIOPTERS: 43.75
OS_K2POWER_DIOPTERS: 44.00
METHOD_AUTO_MANUAL: AUTO
OD_AXISANGLE_DEGREES: 064
OD_K2POWER_DIOPTERS: 44.50

## 2025-03-27 ASSESSMENT — REFRACTION_CURRENTRX
OS_ADD: +1.75
OS_OVR_VA: 20/
OS_SPHERE: +0.25
OD_CYLINDER: -1.25
OD_ADD: +1.75
OD_AXIS: 111
OD_SPHERE: +0.25
OS_AXIS: 074
OD_VPRISM_DIRECTION: PROGS
OS_VPRISM_DIRECTION: PROGS
OD_OVR_VA: 20/
OS_CYLINDER: -0.50

## 2025-03-27 ASSESSMENT — LID EXAM ASSESSMENTS
OD_BLEPHARITIS: 1+
OS_COMMENTS: 1+ LAXIT
OD_COMMENTS: 1+ LAXIT
OS_BLEPHARITIS: 1+

## 2025-03-27 ASSESSMENT — CONFRONTATIONAL VISUAL FIELD TEST (CVF)
OS_FINDINGS: FULL
OD_FINDINGS: FULL

## 2025-03-27 ASSESSMENT — PUNCTA - ASSESSMENT
OS_PUNCTA: LARGE
OD_PUNCTA: MED

## 2025-03-27 ASSESSMENT — TONOMETRY
OS_IOP_MMHG: 16
OD_IOP_MMHG: 17

## 2025-03-27 ASSESSMENT — VISUAL ACUITY
OS_BCVA: 20/25
OD_BCVA: 20/20

## 2025-04-09 ENCOUNTER — RESULT REVIEW (OUTPATIENT)
Age: 65
End: 2025-04-09

## 2025-04-09 ENCOUNTER — APPOINTMENT (OUTPATIENT)
Dept: MAMMOGRAPHY | Facility: CLINIC | Age: 65
End: 2025-04-09
Payer: MEDICARE

## 2025-04-09 ENCOUNTER — APPOINTMENT (OUTPATIENT)
Dept: ULTRASOUND IMAGING | Facility: CLINIC | Age: 65
End: 2025-04-09
Payer: MEDICARE

## 2025-04-09 ENCOUNTER — OUTPATIENT (OUTPATIENT)
Dept: OUTPATIENT SERVICES | Facility: HOSPITAL | Age: 65
LOS: 1 days | End: 2025-04-09
Payer: MEDICARE

## 2025-04-09 DIAGNOSIS — Z12.39 ENCOUNTER FOR OTHER SCREENING FOR MALIGNANT NEOPLASM OF BREAST: ICD-10-CM

## 2025-04-09 DIAGNOSIS — Z12.31 ENCOUNTER FOR SCREENING MAMMOGRAM FOR MALIGNANT NEOPLASM OF BREAST: ICD-10-CM

## 2025-04-09 PROCEDURE — 77063 BREAST TOMOSYNTHESIS BI: CPT

## 2025-04-09 PROCEDURE — 77067 SCR MAMMO BI INCL CAD: CPT | Mod: 26

## 2025-04-09 PROCEDURE — 76641 ULTRASOUND BREAST COMPLETE: CPT | Mod: 26,50,GA

## 2025-04-09 PROCEDURE — 77067 SCR MAMMO BI INCL CAD: CPT

## 2025-04-09 PROCEDURE — 76641 ULTRASOUND BREAST COMPLETE: CPT

## 2025-04-09 PROCEDURE — 77063 BREAST TOMOSYNTHESIS BI: CPT | Mod: 26

## 2025-04-10 ENCOUNTER — APPOINTMENT (OUTPATIENT)
Facility: CLINIC | Age: 65
End: 2025-04-10
Payer: MEDICARE

## 2025-04-10 VITALS — HEIGHT: 67 IN | WEIGHT: 200 LBS | BODY MASS INDEX: 31.39 KG/M2

## 2025-04-10 DIAGNOSIS — M70.61 TROCHANTERIC BURSITIS, RIGHT HIP: ICD-10-CM

## 2025-04-10 DIAGNOSIS — M70.62 TROCHANTERIC BURSITIS, RIGHT HIP: ICD-10-CM

## 2025-04-10 PROCEDURE — 73502 X-RAY EXAM HIP UNI 2-3 VIEWS: CPT

## 2025-04-10 PROCEDURE — 99214 OFFICE O/P EST MOD 30 MIN: CPT

## 2025-04-10 RX ORDER — METHYLPREDNISOLONE 4 MG/1
4 TABLET ORAL
Qty: 1 | Refills: 0 | Status: ACTIVE | COMMUNITY
Start: 2025-04-10 | End: 1900-01-01

## 2025-04-28 ENCOUNTER — APPOINTMENT (OUTPATIENT)
Dept: UROGYNECOLOGY | Facility: CLINIC | Age: 65
End: 2025-04-28
Payer: MEDICARE

## 2025-04-28 VITALS
SYSTOLIC BLOOD PRESSURE: 138 MMHG | BODY MASS INDEX: 31.39 KG/M2 | HEIGHT: 67 IN | DIASTOLIC BLOOD PRESSURE: 86 MMHG | HEART RATE: 74 BPM | WEIGHT: 200 LBS | OXYGEN SATURATION: 94 %

## 2025-04-28 PROCEDURE — 51798 US URINE CAPACITY MEASURE: CPT

## 2025-04-28 PROCEDURE — 99213 OFFICE O/P EST LOW 20 MIN: CPT

## 2025-04-30 ENCOUNTER — APPOINTMENT (OUTPATIENT)
Dept: UROGYNECOLOGY | Facility: CLINIC | Age: 65
End: 2025-04-30

## 2025-05-21 ENCOUNTER — APPOINTMENT (OUTPATIENT)
Dept: MRI IMAGING | Facility: CLINIC | Age: 65
End: 2025-05-21
Payer: MEDICARE

## 2025-05-21 PROCEDURE — 73721 MRI JNT OF LWR EXTRE W/O DYE: CPT | Mod: RT

## 2025-05-23 ENCOUNTER — NON-APPOINTMENT (OUTPATIENT)
Age: 65
End: 2025-05-23

## 2025-06-05 ENCOUNTER — APPOINTMENT (OUTPATIENT)
Facility: CLINIC | Age: 65
End: 2025-06-05

## 2025-06-05 DIAGNOSIS — M70.62 TROCHANTERIC BURSITIS, RIGHT HIP: ICD-10-CM

## 2025-06-05 DIAGNOSIS — M70.61 TROCHANTERIC BURSITIS, RIGHT HIP: ICD-10-CM

## 2025-06-05 PROCEDURE — 99213 OFFICE O/P EST LOW 20 MIN: CPT | Mod: 25

## 2025-06-05 PROCEDURE — 20610 DRAIN/INJ JOINT/BURSA W/O US: CPT | Mod: RT

## 2025-07-07 ENCOUNTER — EMERGENCY (EMERGENCY)
Facility: HOSPITAL | Age: 65
LOS: 0 days | Discharge: HOME | End: 2025-07-07
Attending: EMERGENCY MEDICINE
Payer: MEDICARE

## 2025-07-07 VITALS
TEMPERATURE: 98 F | OXYGEN SATURATION: 98 % | DIASTOLIC BLOOD PRESSURE: 81 MMHG | SYSTOLIC BLOOD PRESSURE: 155 MMHG | RESPIRATION RATE: 19 BRPM | HEART RATE: 55 BPM

## 2025-07-07 VITALS
RESPIRATION RATE: 18 BRPM | WEIGHT: 203.93 LBS | TEMPERATURE: 97 F | DIASTOLIC BLOOD PRESSURE: 61 MMHG | OXYGEN SATURATION: 100 % | HEART RATE: 62 BPM | SYSTOLIC BLOOD PRESSURE: 154 MMHG

## 2025-07-07 DIAGNOSIS — R07.89 OTHER CHEST PAIN: ICD-10-CM
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
## 2025-07-07 DIAGNOSIS — R07.81 PLEURODYNIA: ICD-10-CM

## 2025-07-07 DIAGNOSIS — S30.1XXA CONTUSION OF ABDOMINAL WALL, INITIAL ENCOUNTER: ICD-10-CM
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
## 2025-07-07 LAB
ALBUMIN SERPL ELPH-MCNC: 3.8 G/DL — SIGNIFICANT CHANGE UP (ref 3.3–5)
ALP SERPL-CCNC: 88 U/L — SIGNIFICANT CHANGE UP (ref 40–120)
ALT FLD-CCNC: 21 U/L — SIGNIFICANT CHANGE UP (ref 12–78)
ANION GAP SERPL CALC-SCNC: 4 MMOL/L — LOW (ref 5–17)
AST SERPL-CCNC: 19 U/L — SIGNIFICANT CHANGE UP (ref 15–37)
BASOPHILS # BLD AUTO: 0.06 K/UL — SIGNIFICANT CHANGE UP (ref 0–0.2)
BASOPHILS NFR BLD AUTO: 1.1 % — SIGNIFICANT CHANGE UP (ref 0–2)
BILIRUB SERPL-MCNC: 0.6 MG/DL — SIGNIFICANT CHANGE UP (ref 0.2–1.2)
BUN SERPL-MCNC: 16 MG/DL — SIGNIFICANT CHANGE UP (ref 7–23)
CALCIUM SERPL-MCNC: 9.5 MG/DL — SIGNIFICANT CHANGE UP (ref 8.5–10.1)
CHLORIDE SERPL-SCNC: 108 MMOL/L — SIGNIFICANT CHANGE UP (ref 96–108)
CO2 SERPL-SCNC: 27 MMOL/L — SIGNIFICANT CHANGE UP (ref 22–31)
CREAT SERPL-MCNC: 0.63 MG/DL — SIGNIFICANT CHANGE UP (ref 0.5–1.3)
EGFR: 98 ML/MIN/1.73M2 — SIGNIFICANT CHANGE UP
EGFR: 98 ML/MIN/1.73M2 — SIGNIFICANT CHANGE UP
EOSINOPHIL # BLD AUTO: 0.09 K/UL — SIGNIFICANT CHANGE UP (ref 0–0.5)
EOSINOPHIL NFR BLD AUTO: 1.6 % — SIGNIFICANT CHANGE UP (ref 0–6)
GLUCOSE SERPL-MCNC: 96 MG/DL — SIGNIFICANT CHANGE UP (ref 70–99)
HCT VFR BLD CALC: 41.5 % — SIGNIFICANT CHANGE UP (ref 34.5–45)
HGB BLD-MCNC: 13.4 G/DL — SIGNIFICANT CHANGE UP (ref 11.5–15.5)
HIV 1 & 2 AB SERPL IA.RAPID: SIGNIFICANT CHANGE UP
IMM GRANULOCYTES # BLD AUTO: 0.01 K/UL — SIGNIFICANT CHANGE UP (ref 0–0.07)
IMM GRANULOCYTES NFR BLD AUTO: 0.2 % — SIGNIFICANT CHANGE UP (ref 0–0.9)
LIDOCAIN IGE QN: 28 U/L — SIGNIFICANT CHANGE UP (ref 13–75)
LYMPHOCYTES # BLD AUTO: 2.93 K/UL — SIGNIFICANT CHANGE UP (ref 1–3.3)
LYMPHOCYTES NFR BLD AUTO: 51.3 % — HIGH (ref 13–44)
MCHC RBC-ENTMCNC: 28.7 PG — SIGNIFICANT CHANGE UP (ref 27–34)
MCHC RBC-ENTMCNC: 32.3 G/DL — SIGNIFICANT CHANGE UP (ref 32–36)
MCV RBC AUTO: 88.9 FL — SIGNIFICANT CHANGE UP (ref 80–100)
MONOCYTES # BLD AUTO: 0.47 K/UL — SIGNIFICANT CHANGE UP (ref 0–0.9)
MONOCYTES NFR BLD AUTO: 8.2 % — SIGNIFICANT CHANGE UP (ref 2–14)
NEUTROPHILS # BLD AUTO: 2.15 K/UL — SIGNIFICANT CHANGE UP (ref 1.8–7.4)
NEUTROPHILS NFR BLD AUTO: 37.6 % — LOW (ref 43–77)
NRBC # BLD AUTO: 0 K/UL — SIGNIFICANT CHANGE UP (ref 0–0)
NRBC # FLD: 0 K/UL — SIGNIFICANT CHANGE UP (ref 0–0)
NRBC BLD AUTO-RTO: 0 /100 WBCS — SIGNIFICANT CHANGE UP (ref 0–0)
PLATELET # BLD AUTO: 182 K/UL — SIGNIFICANT CHANGE UP (ref 150–400)
PMV BLD: 9.7 FL — SIGNIFICANT CHANGE UP (ref 7–13)
POTASSIUM SERPL-MCNC: 4.2 MMOL/L — SIGNIFICANT CHANGE UP (ref 3.5–5.3)
POTASSIUM SERPL-SCNC: 4.2 MMOL/L — SIGNIFICANT CHANGE UP (ref 3.5–5.3)
PROT SERPL-MCNC: 7.6 GM/DL — SIGNIFICANT CHANGE UP (ref 6–8.3)
RBC # BLD: 4.67 M/UL — SIGNIFICANT CHANGE UP (ref 3.8–5.2)
RBC # FLD: 14.2 % — SIGNIFICANT CHANGE UP (ref 10.3–14.5)
SODIUM SERPL-SCNC: 139 MMOL/L — SIGNIFICANT CHANGE UP (ref 135–145)
WBC # BLD: 5.71 K/UL — SIGNIFICANT CHANGE UP (ref 3.8–10.5)
WBC # FLD AUTO: 5.71 K/UL — SIGNIFICANT CHANGE UP (ref 3.8–10.5)

## 2025-07-07 PROCEDURE — 74177 CT ABD & PELVIS W/CONTRAST: CPT

## 2025-07-07 PROCEDURE — 86803 HEPATITIS C AB TEST: CPT

## 2025-07-07 PROCEDURE — 86703 HIV-1/HIV-2 1 RESULT ANTBDY: CPT

## 2025-07-07 PROCEDURE — 99284 EMERGENCY DEPT VISIT MOD MDM: CPT | Mod: 25

## 2025-07-07 PROCEDURE — 83690 ASSAY OF LIPASE: CPT

## 2025-07-07 PROCEDURE — 80053 COMPREHEN METABOLIC PANEL: CPT

## 2025-07-07 PROCEDURE — 99285 EMERGENCY DEPT VISIT HI MDM: CPT | Mod: FS

## 2025-07-07 PROCEDURE — 85025 COMPLETE CBC W/AUTO DIFF WBC: CPT

## 2025-07-07 PROCEDURE — 36415 COLL VENOUS BLD VENIPUNCTURE: CPT

## 2025-07-07 PROCEDURE — 74177 CT ABD & PELVIS W/CONTRAST: CPT | Mod: 26

## 2025-07-07 RX ADMIN — Medication 2000 MILLILITER(S): at 12:19

## 2025-07-07 NOTE — ED STATDOCS - NSFOLLOWUPINSTRUCTIONS_ED_ALL_ED_FT
tylenol or motrin for pain  activity as tolerated        Blunt Abdominal Trauma    Blunt abdominal trauma (BAT) includes injuries in which the wall of the abdomen and pelvis or the organs in the abdomen are damaged. These organs may include the liver, spleen, kidneys, stomach, intestines, pancreas, bladder, and uterus. Injured blood vessels from abdominal trauma and pelvic fractures can cause life-threatening internal bleeding. The damage can involve bruising, tearing, or rupture. BAT does not usually involve a puncture of the skin. Motor vehicle crashes are a common cause of BAT. Seat belts reduce the risk of serious injury but do not guarantee against abdominal injuries. If seat belts are worn incorrectly, they may actually cause an abdominal injury.    BAT can range from mild to severe. In some cases, it can lead to severe swelling and irritation of the tissue that lines the inner wall of the abdomen (peritonitis), severe bleeding, and a dangerous drop in blood pressure due to internal bleeding (hypotension and shock).    What are the causes?  This condition may be caused by a hard, direct hit to the abdomen. This can happen:  In a motor vehicle crash.  If you are kicked or punched in the abdomen.  From sports injuries, such as collisions with other players.  If you fall from a significant height.  If you wear your seat belt incorrectly, such as only using the lap belt.  In a motorcycle or bicycle crash from striking the handlebars.  What are the signs or symptoms?  The main symptom of this condition is pain and tenderness in the abdomen. The pain may spread to the back or shoulder. Symptoms are:  Bruising. (In severe motor vehicle crashes, there may be bruising to the area where the seat belt comes in contact with the body.)  Swelling.  Tenderness when pressing on the abdomen.  Blood in the urine.  Nausea and vomiting, and vomiting of blood.  Bloody stool or bleeding from the rectum.  Trouble breathing.  Other symptoms depend on the type and location of the injury. Symptoms can include:  Loss of consciousness.  Confusion.  Dizziness and light-headedness.  Anxiety.  Pale, dark, cool, or sweaty skin.  Weakness.  Symptoms of this injury can develop suddenly or slowly. Some injuries may not be noticed for several days.    How is this diagnosed?  This condition is diagnosed based on your symptoms and a physical exam. You may also have tests, including:  Blood tests.  Urine tests.  Imaging tests, such as:  An ultrasound of the abdomen to look for bleeding.  A CT scan of the abdomen to identify specific injuries.  X-rays of your chest, abdomen, and pelvis. These may show fractures of the ribs and pelvis.  How is this treated?  Treatment for this condition depends on the injury and how severe it is. Treatment may include:  Repeated exams of the abdomen. This is done if the injury is mild.  Support for blood pressure, heart rate, and breathing.  Getting blood, fluids, medicine, or nutrition (total parenteral nutrition, or TPN) through an IV.  Antibiotic medicine.  A blood transfusion. This may be given if you have lost a significant amount of blood.  Angiographic embolization. This is a procedure to stop bleeding by putting a small, thin tube (catheter) into one of your blood vessels. Steel coils or a clotting agent may then be placed into the bleeding vessel to stop blood loss.  Laparotomy. This is a surgery to open up your abdomen to control bleeding or repair damage. This may be done if you have peritonitis or bleeding that cannot be controlled with angiographic embolization. This also may be done if internal bleeding is suspected.  Follow these instructions at home:  Medicines    Take over-the-counter and prescription medicines only as told by your health care provider.  If you were prescribed an antibiotic medicine, take it as told by your health care provider. Do not stop using the antibiotic even if you start to feel better.  Ask your health care provider if the medicine prescribed to you:  Requires you to avoid driving or using machinery.  Can cause constipation. You may need to take these actions to prevent or treat constipation:  Drink enough fluid to keep your urine pale yellow.  Take over-the-counter or prescription medicines.  Eat foods that are high in fiber, such as beans, whole grains, and fresh fruits and vegetables.  Limit foods that are high in fat and processed sugars, such as fried or sweet foods.  General instructions    Do not use any products that contain nicotine or tobacco, such as cigarettes, e-cigarettes, and chewing tobacco. These can delay healing after an injury. If you need help quitting, ask your health care provider.  Return to your normal activities as told by your health care provider. Ask your health care provider what activities are safe for you.  Keep all follow-up visits. This is important to your treatment plan. Follow-up visits may include counseling and appointments with different medical specialists.  Get help right away if:  You have worsening abdominal, back, or chest pain.  You have worsening abdominal tenderness.  You have worsening abdominal swelling (distension).  You have any of these:  Blood in your urine or stool.  You stop producing urine or fail to have a bowel movement after 2–3 days.  Trouble with breathing.  A high fever.  Dizziness.  You vomit blood.  Your skin looks very pale.  These symptoms may represent a serious problem that is an emergency. Do not wait to see if the symptoms will go away. Get medical help right away. Call your local emergency services (911 in the [place] Do not drive yourself to the hospital.    Summary  Blunt abdominal trauma (BAT) includes injuries in which the wall of the abdomen and pelvis or the organs in the abdomen are damaged. It may be caused by a hard, direct hit to the abdomen. Motor vehicle crashes are a common cause of BAT.  Symptoms include pain and tenderness in the abdomen, bruising, swelling, blood in the urine or stool, difficulty breathing, weakness, confusion, or loss of consciousness.  Treatment for this condition depends on what type of injury you have and how severe it is. Some serious injuries require urgent surgery.  This information is not intended to replace advice given to you by your health care provider. Make sure you discuss any questions you have with your health care provider.    Document Revised: 08/14/2024 Document Reviewed: 08/14/2024  [employer] Patient Education © 2025 [employer]

## 2025-07-07 NOTE — ED STATDOCS - PROGRESS NOTE DETAILS
pt seen with ER attending for abdominal pain s/p falling off of a boat step as she was getting onto the boat yesterday slipping and hitting her abdo on the steps Sustained bruising to the area and pain today. denied vomiting blood in stools    will ct scan abdo discussed the results with the patient and the plan to follow up with her doctor  pain will persist for the next 2 - 3 days.

## 2025-07-07 NOTE — ED STATDOCS - OBJECTIVE STATEMENT
65 y/o female with no pertinent HX presents to ED. Patient fell of boat and landed on right side, endorses right rib pain. States fall was from low height of less than a foot.

## 2025-07-07 NOTE — ED STATDOCS - PHYSICAL EXAMINATION
GEN: Patient awake alert NAD.   HEENT: normocephalic, atraumatic, EOMI, no scleral icterus, moist MM  CARDIAC: RRR, S1, S2, no murmur.   PULM: CTA B/L no wheeze, rhonchi, rales.   ABD: bruising on RUQ  MSK: Moving all extremities, no edema. 5/5 strength and full ROM in all extremities.     NEURO: A&Ox3, gait normal, no focal neurological deficits, CN 2-12 grossly intact  SKIN: warm, dry, no rash. GEN: Patient awake alert NAD.   HEENT: normocephalic, atraumatic, EOMI, no scleral icterus, moist MM  CARDIAC: RRR, S1, S2, no murmur.   PULM: CTA B/L no wheeze, rhonchi, rales.   ABD: bruising on RUQ, tender to palp bruied area  MSK: Moving all extremities, no edema. 5/5 strength and full ROM in all extremities.     NEURO: A&Ox3, gait normal, no focal neurological deficits, CN 2-12 grossly intact  SKIN: warm, dry, no rash.

## 2025-07-07 NOTE — ED ADULT TRIAGE NOTE - CHIEF COMPLAINT QUOTE
PT presents to er with complaints of right sided rib pain falling on a boat, on daily asa, sent in from urgent care.

## 2025-07-07 NOTE — ED STATDOCS - PATIENT PORTAL LINK FT
You can access the FollowMyHealth Patient Portal offered by [facility] by registering at the following website: [email or web address]. By joining [employer]’s FollowMyHealth portal, you will also be able to view your health information using other applications (apps) compatible with our system.

## 2025-07-07 NOTE — ED ADULT NURSE NOTE - OBJECTIVE STATEMENT
presenting to the ed C/O R sided rib pain S/P slip and fall off of a boat ladder x 2 days ago, -HS, -LOC. Pt endorsing R sided bruising and was sent from  for CT abd

## 2025-07-08 LAB
HCV AB S/CO SERPL IA: 0.14 S/CO — SIGNIFICANT CHANGE UP (ref 0–0.79)
HCV AB SERPL-IMP: SIGNIFICANT CHANGE UP

## 2025-09-16 ENCOUNTER — APPOINTMENT (OUTPATIENT)
Facility: CLINIC | Age: 65
End: 2025-09-16
Payer: MEDICARE

## 2025-09-16 VITALS — WEIGHT: 200 LBS | HEIGHT: 67 IN | BODY MASS INDEX: 31.39 KG/M2

## 2025-09-16 DIAGNOSIS — M70.62 TROCHANTERIC BURSITIS, RIGHT HIP: ICD-10-CM

## 2025-09-16 DIAGNOSIS — M70.61 TROCHANTERIC BURSITIS, RIGHT HIP: ICD-10-CM

## 2025-09-16 PROCEDURE — 99213 OFFICE O/P EST LOW 20 MIN: CPT | Mod: 25

## 2025-09-16 PROCEDURE — 20610 DRAIN/INJ JOINT/BURSA W/O US: CPT | Mod: RT
